# Patient Record
Sex: MALE | Race: WHITE | Employment: FULL TIME | URBAN - NONMETROPOLITAN AREA
[De-identification: names, ages, dates, MRNs, and addresses within clinical notes are randomized per-mention and may not be internally consistent; named-entity substitution may affect disease eponyms.]

---

## 2018-05-08 PROBLEM — K42.9 UMBILICAL HERNIA WITHOUT OBSTRUCTION AND WITHOUT GANGRENE: Status: ACTIVE | Noted: 2018-05-08

## 2018-05-23 ENCOUNTER — OFFICE VISIT (OUTPATIENT)
Dept: SURGERY | Age: 53
End: 2018-05-23
Payer: COMMERCIAL

## 2018-05-23 ENCOUNTER — TELEPHONE (OUTPATIENT)
Dept: SURGERY | Age: 53
End: 2018-05-23

## 2018-05-23 VITALS
SYSTOLIC BLOOD PRESSURE: 126 MMHG | HEART RATE: 80 BPM | RESPIRATION RATE: 18 BRPM | BODY MASS INDEX: 33.64 KG/M2 | TEMPERATURE: 98.6 F | WEIGHT: 235 LBS | HEIGHT: 70 IN | DIASTOLIC BLOOD PRESSURE: 80 MMHG

## 2018-05-23 DIAGNOSIS — I10 ESSENTIAL HYPERTENSION: Primary | ICD-10-CM

## 2018-05-23 DIAGNOSIS — K42.9 UMBILICAL HERNIA WITHOUT OBSTRUCTION AND WITHOUT GANGRENE: ICD-10-CM

## 2018-05-23 DIAGNOSIS — Z01.818 PRE-OP TESTING: ICD-10-CM

## 2018-05-23 PROCEDURE — 99203 OFFICE O/P NEW LOW 30 MIN: CPT | Performed by: SURGERY

## 2018-05-23 RX ORDER — QUINAPRIL HCL AND HYDROCHLOROTHIAZIDE 20; 25 MG/1; MG/1
1 TABLET ORAL DAILY
Refills: 1 | COMMUNITY
Start: 2018-04-16 | End: 2022-04-01

## 2018-05-23 ASSESSMENT — ENCOUNTER SYMPTOMS
APNEA: 0
CHEST TIGHTNESS: 0
EYE PAIN: 0
ABDOMINAL DISTENTION: 1
EYE DISCHARGE: 0
SINUS PAIN: 0
SORE THROAT: 0
CHOKING: 0
DIARRHEA: 0
ANAL BLEEDING: 0
WHEEZING: 0
TROUBLE SWALLOWING: 0
VOMITING: 0
PHOTOPHOBIA: 0
RHINORRHEA: 0
VOICE CHANGE: 0
SHORTNESS OF BREATH: 0
FACIAL SWELLING: 0
EYE ITCHING: 0
COUGH: 0
SINUS PRESSURE: 0
RECTAL PAIN: 0
EYE REDNESS: 0
NAUSEA: 0
BACK PAIN: 0
ABDOMINAL PAIN: 1
CONSTIPATION: 0
STRIDOR: 0
COLOR CHANGE: 0
BLOOD IN STOOL: 0

## 2018-06-27 ENCOUNTER — TELEPHONE (OUTPATIENT)
Dept: SURGERY | Age: 53
End: 2018-06-27

## 2018-07-03 ENCOUNTER — HOSPITAL ENCOUNTER (OUTPATIENT)
Age: 53
Discharge: HOME | End: 2018-07-03
Payer: COMMERCIAL

## 2018-07-03 DIAGNOSIS — Z01.818 PRE-OP TESTING: ICD-10-CM

## 2018-07-03 DIAGNOSIS — I10 ESSENTIAL HYPERTENSION: ICD-10-CM

## 2018-07-03 DIAGNOSIS — K42.9 UMBILICAL HERNIA WITHOUT OBSTRUCTION AND WITHOUT GANGRENE: ICD-10-CM

## 2018-07-03 LAB
ANION GAP SERPL CALCULATED.3IONS-SCNC: 11 MEQ/L (ref 8–16)
BUN BLDV-MCNC: 15 MG/DL (ref 7–22)
CALCIUM SERPL-MCNC: 9.4 MG/DL (ref 8.5–10.5)
CHLORIDE BLD-SCNC: 101 MEQ/L (ref 98–111)
CO2: 29 MEQ/L (ref 23–33)
CREAT SERPL-MCNC: 1.1 MG/DL (ref 0.4–1.2)
EKG ATRIAL RATE: 74 BPM
EKG P AXIS: 52 DEGREES
EKG P-R INTERVAL: 188 MS
EKG Q-T INTERVAL: 398 MS
EKG QRS DURATION: 82 MS
EKG QTC CALCULATION (BAZETT): 441 MS
EKG R AXIS: 96 DEGREES
EKG T AXIS: 7 DEGREES
EKG VENTRICULAR RATE: 74 BPM
GFR SERPL CREATININE-BSD FRML MDRD: 70 ML/MIN/1.73M2
GLUCOSE BLD-MCNC: 109 MG/DL (ref 70–108)
POTASSIUM SERPL-SCNC: 4.5 MEQ/L (ref 3.5–5.2)
SODIUM BLD-SCNC: 141 MEQ/L (ref 135–145)

## 2018-07-03 PROCEDURE — 80048 BASIC METABOLIC PNL TOTAL CA: CPT

## 2018-07-03 PROCEDURE — 93005 ELECTROCARDIOGRAM TRACING: CPT

## 2018-07-03 PROCEDURE — 36415 COLL VENOUS BLD VENIPUNCTURE: CPT

## 2018-07-04 PROCEDURE — 93010 ELECTROCARDIOGRAM REPORT: CPT | Performed by: INTERNAL MEDICINE

## 2018-07-09 ENCOUNTER — OFFICE VISIT (OUTPATIENT)
Dept: CARDIOLOGY CLINIC | Age: 53
End: 2018-07-09
Payer: COMMERCIAL

## 2018-07-09 VITALS
BODY MASS INDEX: 33.64 KG/M2 | DIASTOLIC BLOOD PRESSURE: 89 MMHG | HEART RATE: 82 BPM | WEIGHT: 235 LBS | HEIGHT: 70 IN | SYSTOLIC BLOOD PRESSURE: 135 MMHG

## 2018-07-09 DIAGNOSIS — Z01.810 PREOP CARDIOVASCULAR EXAM: Primary | ICD-10-CM

## 2018-07-09 PROCEDURE — 99204 OFFICE O/P NEW MOD 45 MIN: CPT | Performed by: INTERNAL MEDICINE

## 2018-07-09 ASSESSMENT — ENCOUNTER SYMPTOMS
EYE PAIN: 0
BOWEL INCONTINENCE: 0
ORTHOPNEA: 0
ABDOMINAL PAIN: 0
EYE DISCHARGE: 0
HEMOPTYSIS: 0
HOARSE VOICE: 0
DOUBLE VISION: 0
CHANGE IN BOWEL HABIT: 0
CONSTIPATION: 0
BACK PAIN: 0
COUGH: 0
BLURRED VISION: 0
SPUTUM PRODUCTION: 0
BLOATING: 0
DIARRHEA: 0
SHORTNESS OF BREATH: 1

## 2018-07-09 NOTE — PROGRESS NOTES
Patient here as new patient - Needing Cardiac Clearance     Pt complains of dizziness,palpitations,peripheral edema,shortness of breath on occasion    Pt denies:chest pain    EKG done 7-3-2018
normal.   Skin: Skin is warm and dry. Psychiatric: He has a normal mood and affect. Nursing note and vitals reviewed. No results found for: CKTOTAL, CKMB, CKMBINDEX    No results found for: WBC, RBC, HGB, HCT, MCV, MCH, MCHC, RDW, PLT, MPV    Lab Results   Component Value Date     07/03/2018    K 4.5 07/03/2018     07/03/2018    CO2 29 07/03/2018    BUN 15 07/03/2018    CREATININE 1.1 07/03/2018    CALCIUM 9.4 07/03/2018    LABGLOM 70 07/03/2018    GLUCOSE 109 07/03/2018       No results found for: ALKPHOS, ALT, AST, PROT, BILITOT, BILIDIR, IBILI, LABALBU    No results found for: MG    No results found for: INR, PROTIME      No results found for: LABA1C    No results found for: TRIG, HDL, LDLCALC, LDLDIRECT, LABVLDL    No results found for: TSH      Testing Reviewed:      I have individually reviewed the below cardiac tests    EKG:SR, inferior TWI, anterolateral infarct    ECHO: No results found for this or any previous visit. STRESS:    CATH:    Assessment/Plan       Diagnosis Orders   1. Preop cardiovascular exam  Lipid Panel    Echo 2D w doppler w color complete    NM MYOCARDIAL SPECT REST EXERCISE OR RX    [facility]     Preop risk startification for umbilical hernia repair  HTN  Obesity    Reviewed EKG  Reports dyspnea on exertion  Will get Exercise stress test with cardiolite and echo  BP well controlled  Continue Theotis Priestly  Will also refer to sleep study  Will get lipid profile  The patient is asked to make an attempt to improve diet and exercise patterns to aid in medical management of this problem. Advised patient to call office or seek immediate medical attention if there is any new onset of  any chest pain, sob, palpitations, lightheadedness, dizziness, orthopnea, PND or pedal edema. Thank you for allowing me to participate in the care of this patient. Please do not hesitate to contact me for any further questions.      Return in about 4

## 2018-07-10 ENCOUNTER — HOSPITAL ENCOUNTER (OUTPATIENT)
Dept: NON INVASIVE DIAGNOSTICS | Age: 53
Discharge: HOME | End: 2018-07-10
Payer: COMMERCIAL

## 2018-07-10 DIAGNOSIS — Z01.810 PREOP CARDIOVASCULAR EXAM: ICD-10-CM

## 2018-07-10 LAB
LV EF: 59 %
LVEF MODALITY: NORMAL

## 2018-07-10 PROCEDURE — 93017 CV STRESS TEST TRACING ONLY: CPT | Performed by: NUCLEAR MEDICINE

## 2018-07-10 PROCEDURE — 3430000000 HC RX DIAGNOSTIC RADIOPHARMACEUTICAL: Performed by: INTERNAL MEDICINE

## 2018-07-10 PROCEDURE — 78452 HT MUSCLE IMAGE SPECT MULT: CPT

## 2018-07-10 PROCEDURE — A9500 TC99M SESTAMIBI: HCPCS | Performed by: INTERNAL MEDICINE

## 2018-07-10 RX ADMIN — Medication 9.4 MILLICURIE: at 07:15

## 2018-07-10 RX ADMIN — Medication 32 MILLICURIE: at 08:35

## 2018-07-11 ENCOUNTER — TELEPHONE (OUTPATIENT)
Dept: CARDIOLOGY CLINIC | Age: 53
End: 2018-07-11

## 2018-07-12 ENCOUNTER — ANESTHESIA (OUTPATIENT)
Dept: OPERATING ROOM | Age: 53
End: 2018-07-12
Payer: COMMERCIAL

## 2018-07-12 ENCOUNTER — ANESTHESIA EVENT (OUTPATIENT)
Dept: OPERATING ROOM | Age: 53
End: 2018-07-12
Payer: COMMERCIAL

## 2018-07-12 ENCOUNTER — HOSPITAL ENCOUNTER (OUTPATIENT)
Age: 53
Setting detail: OUTPATIENT SURGERY
Discharge: HOME | End: 2018-07-12
Attending: SURGERY | Admitting: SURGERY
Payer: COMMERCIAL

## 2018-07-12 VITALS
SYSTOLIC BLOOD PRESSURE: 108 MMHG | WEIGHT: 235 LBS | TEMPERATURE: 97 F | RESPIRATION RATE: 16 BRPM | HEART RATE: 65 BPM | BODY MASS INDEX: 33.64 KG/M2 | OXYGEN SATURATION: 99 % | HEIGHT: 70 IN | DIASTOLIC BLOOD PRESSURE: 81 MMHG

## 2018-07-12 VITALS
DIASTOLIC BLOOD PRESSURE: 65 MMHG | SYSTOLIC BLOOD PRESSURE: 98 MMHG | OXYGEN SATURATION: 81 % | RESPIRATION RATE: 5 BRPM

## 2018-07-12 DIAGNOSIS — Z09 S/P UMBILICAL HERNIA REPAIR, FOLLOW-UP EXAM: Primary | ICD-10-CM

## 2018-07-12 DIAGNOSIS — K42.9 UMBILICAL HERNIA WITHOUT OBSTRUCTION AND WITHOUT GANGRENE: ICD-10-CM

## 2018-07-12 LAB — POTASSIUM SERPL-SCNC: 3.9 MEQ/L (ref 3.5–5.2)

## 2018-07-12 PROCEDURE — 7100000010 HC PHASE II RECOVERY - FIRST 15 MIN: Performed by: SURGERY

## 2018-07-12 PROCEDURE — 6360000002 HC RX W HCPCS: Performed by: SURGERY

## 2018-07-12 PROCEDURE — 7100000001 HC PACU RECOVERY - ADDTL 15 MIN: Performed by: SURGERY

## 2018-07-12 PROCEDURE — 84132 ASSAY OF SERUM POTASSIUM: CPT

## 2018-07-12 PROCEDURE — 49585 REPAIR UMBILICAL HERN,5+Y/O,REDUC: CPT | Performed by: SURGERY

## 2018-07-12 PROCEDURE — 7100000000 HC PACU RECOVERY - FIRST 15 MIN: Performed by: SURGERY

## 2018-07-12 PROCEDURE — 7100000011 HC PHASE II RECOVERY - ADDTL 15 MIN: Performed by: SURGERY

## 2018-07-12 PROCEDURE — 3700000000 HC ANESTHESIA ATTENDED CARE: Performed by: SURGERY

## 2018-07-12 PROCEDURE — 2580000003 HC RX 258: Performed by: SURGERY

## 2018-07-12 PROCEDURE — 2500000003 HC RX 250 WO HCPCS: Performed by: SURGERY

## 2018-07-12 PROCEDURE — C1781 MESH (IMPLANTABLE): HCPCS | Performed by: SURGERY

## 2018-07-12 PROCEDURE — 3600000003 HC SURGERY LEVEL 3 BASE: Performed by: SURGERY

## 2018-07-12 PROCEDURE — 36415 COLL VENOUS BLD VENIPUNCTURE: CPT

## 2018-07-12 PROCEDURE — 3600000013 HC SURGERY LEVEL 3 ADDTL 15MIN: Performed by: SURGERY

## 2018-07-12 PROCEDURE — 3700000001 HC ADD 15 MINUTES (ANESTHESIA): Performed by: SURGERY

## 2018-07-12 PROCEDURE — 6370000000 HC RX 637 (ALT 250 FOR IP): Performed by: SURGERY

## 2018-07-12 PROCEDURE — 6360000002 HC RX W HCPCS: Performed by: NURSE ANESTHETIST, CERTIFIED REGISTERED

## 2018-07-12 DEVICE — PATCH HERN M DIA2.5IN CIR W/ STRP SEPRA TECHNOLOGY ABSRB: Type: IMPLANTABLE DEVICE | Site: UMBILICAL | Status: FUNCTIONAL

## 2018-07-12 RX ORDER — MORPHINE SULFATE 2 MG/ML
2 INJECTION, SOLUTION INTRAMUSCULAR; INTRAVENOUS
Status: DISCONTINUED | OUTPATIENT
Start: 2018-07-12 | End: 2018-07-12 | Stop reason: HOSPADM

## 2018-07-12 RX ORDER — MIDAZOLAM HYDROCHLORIDE 1 MG/ML
INJECTION INTRAMUSCULAR; INTRAVENOUS PRN
Status: DISCONTINUED | OUTPATIENT
Start: 2018-07-12 | End: 2018-07-12 | Stop reason: SDUPTHER

## 2018-07-12 RX ORDER — PROPOFOL 10 MG/ML
INJECTION, EMULSION INTRAVENOUS PRN
Status: DISCONTINUED | OUTPATIENT
Start: 2018-07-12 | End: 2018-07-12 | Stop reason: SDUPTHER

## 2018-07-12 RX ORDER — SODIUM CHLORIDE 0.9 % (FLUSH) 0.9 %
10 SYRINGE (ML) INJECTION EVERY 12 HOURS SCHEDULED
Status: DISCONTINUED | OUTPATIENT
Start: 2018-07-12 | End: 2018-07-12 | Stop reason: HOSPADM

## 2018-07-12 RX ORDER — MORPHINE SULFATE 2 MG/ML
4 INJECTION, SOLUTION INTRAMUSCULAR; INTRAVENOUS
Status: DISCONTINUED | OUTPATIENT
Start: 2018-07-12 | End: 2018-07-12 | Stop reason: HOSPADM

## 2018-07-12 RX ORDER — SODIUM CHLORIDE 0.9 % (FLUSH) 0.9 %
10 SYRINGE (ML) INJECTION PRN
Status: DISCONTINUED | OUTPATIENT
Start: 2018-07-12 | End: 2018-07-12 | Stop reason: HOSPADM

## 2018-07-12 RX ORDER — HYDROCODONE BITARTRATE AND ACETAMINOPHEN 5; 325 MG/1; MG/1
1 TABLET ORAL EVERY 4 HOURS PRN
Qty: 42 TABLET | Refills: 0 | Status: SHIPPED | OUTPATIENT
Start: 2018-07-12 | End: 2018-07-19

## 2018-07-12 RX ORDER — HYDROCODONE BITARTRATE AND ACETAMINOPHEN 5; 325 MG/1; MG/1
2 TABLET ORAL EVERY 4 HOURS PRN
Status: DISCONTINUED | OUTPATIENT
Start: 2018-07-12 | End: 2018-07-12 | Stop reason: HOSPADM

## 2018-07-12 RX ORDER — ACETAMINOPHEN 325 MG/1
650 TABLET ORAL EVERY 4 HOURS PRN
Status: DISCONTINUED | OUTPATIENT
Start: 2018-07-12 | End: 2018-07-12 | Stop reason: HOSPADM

## 2018-07-12 RX ORDER — FENTANYL CITRATE 50 UG/ML
INJECTION, SOLUTION INTRAMUSCULAR; INTRAVENOUS PRN
Status: DISCONTINUED | OUTPATIENT
Start: 2018-07-12 | End: 2018-07-12 | Stop reason: SDUPTHER

## 2018-07-12 RX ORDER — ONDANSETRON 2 MG/ML
4 INJECTION INTRAMUSCULAR; INTRAVENOUS EVERY 6 HOURS PRN
Status: DISCONTINUED | OUTPATIENT
Start: 2018-07-12 | End: 2018-07-12 | Stop reason: HOSPADM

## 2018-07-12 RX ORDER — HYDROCODONE BITARTRATE AND ACETAMINOPHEN 5; 325 MG/1; MG/1
1 TABLET ORAL EVERY 4 HOURS PRN
Status: DISCONTINUED | OUTPATIENT
Start: 2018-07-12 | End: 2018-07-12 | Stop reason: HOSPADM

## 2018-07-12 RX ORDER — SODIUM CHLORIDE 9 MG/ML
INJECTION, SOLUTION INTRAVENOUS CONTINUOUS
Status: DISCONTINUED | OUTPATIENT
Start: 2018-07-12 | End: 2018-07-12 | Stop reason: HOSPADM

## 2018-07-12 RX ADMIN — MIDAZOLAM HYDROCHLORIDE 2 MG: 1 INJECTION, SOLUTION INTRAMUSCULAR; INTRAVENOUS at 08:37

## 2018-07-12 RX ADMIN — SODIUM CHLORIDE: 9 INJECTION, SOLUTION INTRAVENOUS at 08:33

## 2018-07-12 RX ADMIN — SODIUM CHLORIDE: 9 INJECTION, SOLUTION INTRAVENOUS at 07:37

## 2018-07-12 RX ADMIN — HYDROCODONE BITARTRATE AND ACETAMINOPHEN 1 TABLET: 5; 325 TABLET ORAL at 10:21

## 2018-07-12 RX ADMIN — FENTANYL CITRATE 50 MCG: 50 INJECTION INTRAMUSCULAR; INTRAVENOUS at 09:00

## 2018-07-12 RX ADMIN — PROPOFOL 50 MG: 10 INJECTION, EMULSION INTRAVENOUS at 08:45

## 2018-07-12 RX ADMIN — FENTANYL CITRATE 100 MCG: 50 INJECTION INTRAMUSCULAR; INTRAVENOUS at 08:45

## 2018-07-12 RX ADMIN — FENTANYL CITRATE 100 MCG: 50 INJECTION INTRAMUSCULAR; INTRAVENOUS at 08:37

## 2018-07-12 RX ADMIN — Medication 2 G: at 08:33

## 2018-07-12 RX ADMIN — PROPOFOL 50 MG: 10 INJECTION, EMULSION INTRAVENOUS at 08:37

## 2018-07-12 ASSESSMENT — PULMONARY FUNCTION TESTS
PIF_VALUE: 0
PIF_VALUE: 23
PIF_VALUE: 0
PIF_VALUE: 13
PIF_VALUE: 0
PIF_VALUE: 0

## 2018-07-12 ASSESSMENT — PAIN SCALES - GENERAL
PAINLEVEL_OUTOF10: 2
PAINLEVEL_OUTOF10: 3
PAINLEVEL_OUTOF10: 2
PAINLEVEL_OUTOF10: 3

## 2018-07-12 NOTE — H&P
for pulse less than 50 or greater than 120, respiratory rate less than 12 or greater than 25, temperature greater than 38.5, urinary output less than 240 mL in 8 hours, systolic BP less than 90 or greater than 717, diastolic BP less than 50 or greater than 100. Standing Status:   Standing     Number of Occurrences:   1    Up as tolerated     Standing Status:   Standing     Number of Occurrences:   40112    Weigh patient     Standing Status:   Standing     Number of Occurrences:   1    Verify surgical site confirmation documentation completed     Standing Status:   Standing     Number of Occurrences:   1    Verify discontinuation of anticoagulants/antiplatelets unless otherwise specified by physician     Standing Status:   Standing     Number of Occurrences:   1    Nursing communication- beta-blocker     If the patient is on beta-blocker medication document date and time of last dose. If the patient has not taken the beta-blocker medication within 24 hours and there is no order to give the patient a beta-blocker, notify the physician.      Standing Status:   Standing     Number of Occurrences:   1    Void on call to OR     Standing Status:   Standing     Number of Occurrences:   1    Place intermittent pneumatic compression device     When appropriate prophylactic therapy cannot be provided due to patient limitations, notify physician     Standing Status:   Standing     Number of Occurrences:   1    Full Code     Standing Status:   Standing     Number of Occurrences:   1    Initiate Oxygen Therapy Protocol     - If patient has any of the following conditions, initiate oxygen therapy: SpO2 less than 92%, Cyanosis, Chest Pain, Dyspnea, Home oxygen, or Altered level of consciousness    - If oxygen therapy initiated, enter the RT51 Nasal cannula oxygen order using Per Protocol order mode using the defaulted order parameters and titrate as specified in that order    - If oxygen therapy initiated, notify

## 2018-07-12 NOTE — ANESTHESIA PRE PROCEDURE
intended and Prophylactic antiemetics administered. Anesthetic plan and risks discussed with patient and spouse. Plan discussed with CRNA. Aria Cowan.  DO Mervat   7/12/2018

## 2018-07-13 ENCOUNTER — TELEPHONE (OUTPATIENT)
Dept: SURGERY | Age: 53
End: 2018-07-13

## 2018-07-17 ENCOUNTER — HOSPITAL ENCOUNTER (OUTPATIENT)
Dept: NON INVASIVE DIAGNOSTICS | Age: 53
Discharge: HOME | End: 2018-07-17
Payer: COMMERCIAL

## 2018-07-17 DIAGNOSIS — Z01.810 PREOP CARDIOVASCULAR EXAM: ICD-10-CM

## 2018-07-17 LAB
LV EF: 58 %
LVEF MODALITY: NORMAL

## 2018-07-17 PROCEDURE — 93306 TTE W/DOPPLER COMPLETE: CPT

## 2018-07-24 PROBLEM — Z09 S/P UMBILICAL HERNIA REPAIR, FOLLOW-UP EXAM: Status: ACTIVE | Noted: 2018-07-24

## 2018-07-24 NOTE — PROGRESS NOTES
to examination. SKIN: Skin color, texture, turgor normal. No rashes or lesions. INCISION: wound margins Still covered with glue . The glue was removed in the office there is a little bit of skin edge necrosis in the center on the bottom edge but no drainage no visible suture the incisions intact we'll continue to observe this for another 2 weeks. Anabolic ointment dressing was replaced  ABDOMEN: soft, nontender, nondistended, no masses or organomegaly. Bowel sounds normal. umbilical scar present with prominent healing ridge. No sign of recurrence, hematoma or seroma. Tenderness to palpation incisional only.                     Electronically signed by Garret Jimenez MD, FACS on 7/25/2018 at 9:11 AM

## 2018-07-25 ENCOUNTER — OFFICE VISIT (OUTPATIENT)
Dept: SURGERY | Age: 53
End: 2018-07-25

## 2018-07-25 VITALS
OXYGEN SATURATION: 98 % | SYSTOLIC BLOOD PRESSURE: 128 MMHG | DIASTOLIC BLOOD PRESSURE: 78 MMHG | TEMPERATURE: 98.7 F | HEART RATE: 89 BPM | RESPIRATION RATE: 18 BRPM

## 2018-07-25 DIAGNOSIS — Z09 S/P UMBILICAL HERNIA REPAIR, FOLLOW-UP EXAM: ICD-10-CM

## 2018-07-25 PROCEDURE — 99024 POSTOP FOLLOW-UP VISIT: CPT | Performed by: SURGERY

## 2018-08-07 PROBLEM — K42.9 UMBILICAL HERNIA WITHOUT OBSTRUCTION AND WITHOUT GANGRENE: Status: RESOLVED | Noted: 2018-05-08 | Resolved: 2018-08-07

## 2018-08-07 NOTE — PROGRESS NOTES
Outpatient Prescriptions on File Prior to Visit   Medication Sig Dispense Refill    quinapril-hydrochlorothiazide (ACCURETIC) 20-25 MG per tablet Take 1 tablet by mouth daily  1     No current facility-administered medications on file prior to visit. Allergies  Allergies   Allergen Reactions    Penicillins Hives     Social History  Social History     Social History    Marital status:      Spouse name: N/A    Number of children: N/A    Years of education: N/A     Occupational History    Not on file. Social History Main Topics    Smoking status: Never Smoker    Smokeless tobacco: Never Used    Alcohol use Yes      Comment: occasional    Drug use: No    Sexual activity: Not on file     Other Topics Concern    Not on file     Social History Narrative    No narrative on file     [address] Maintenance   Topic Date Due    HIV screen  01/01/1982    DTaP/Tdap/Td vaccine (1 - Tdap) 01/01/1986    Lipid screen  01/01/2007    Diabetes screen  01/01/2007    Shingles Vaccine (1 of 2 - 2 Dose Series) 01/01/2017    Colon cancer screen colonoscopy  01/01/2017    Flu vaccine (1) 09/01/2018    Creatinine monitoring  07/03/2019    Potassium monitoring  07/12/2019     Review of Systems  History obtained from the patient. Constitutional: Denies any fever, chills, fatigue. Wound: Denies any rash, skin color changes or wound problems. Resp: Denies any cough, shortness of breath. CV: Denies any chest pain, orthopnea or syncope. GI: Positive for incisional discomfort only. Denies any nausea, vomiting, blood in the stool, constipation or diarrhea. : Denies any hematuria, hesitancy or dysuria. OBJECTIVE    VITALS:  tympanic temperature is 98 °F (36.7 °C). His blood pressure is 118/78 and his pulse is 84. His respiration is 18 and oxygen saturation is 99%. CONSTITUTIONAL: Alert and oriented times 3, no acute distress and cooperative to examination.   SKIN: Skin color, texture, turgor normal. No rashes or lesions. INCISION: wound margins Still covered with glue . The glue was removed in the office there is a little bit of skin edge necrosis in the center on the bottom edge but no drainage no visible suture the incisions intact we'll continue to observe this for another 2 weeks. Anabolic ointment dressing was replaced  ABDOMEN: soft, nontender, nondistended, no masses or organomegaly. Bowel sounds normal. umbilical scar present with prominent healing ridge. Some scabbing present, but no signs of infection. No sign of recurrence, hematoma or seroma. Tenderness to palpation incisional only.                     Electronically signed by Jackie Morillo MD FACS on 8/8/2018 at 9:56 AM

## 2018-08-08 ENCOUNTER — OFFICE VISIT (OUTPATIENT)
Dept: SURGERY | Age: 53
End: 2018-08-08

## 2018-08-08 VITALS
TEMPERATURE: 98 F | HEART RATE: 84 BPM | RESPIRATION RATE: 18 BRPM | DIASTOLIC BLOOD PRESSURE: 78 MMHG | SYSTOLIC BLOOD PRESSURE: 118 MMHG | OXYGEN SATURATION: 99 %

## 2018-08-08 DIAGNOSIS — Z09 S/P UMBILICAL HERNIA REPAIR, FOLLOW-UP EXAM: Primary | ICD-10-CM

## 2018-08-08 PROCEDURE — 99024 POSTOP FOLLOW-UP VISIT: CPT | Performed by: SURGERY

## 2018-08-20 NOTE — PROGRESS NOTES
Aidan Mccrary MD at [address] [facility],[address]   Allergen Reactions    Penicillins Hives       Current Outpatient Prescriptions   Medication Sig Dispense Refill    quinapril-hydrochlorothiazide (ACCURETIC) 20-25 MG per tablet Take 1 tablet by mouth daily  1     No current facility-administered medications for this visit. Family History   Problem Relation Age of Onset    Kidney Disease Father         Review of Systems   General/Constitutional: He gained 10lbs of weight in the last 1month with good appetite. No fever or chills. HENT: Negative. Eyes: Negative. Upper respiratory tract: No nasal stuffiness or post nasal drip. Lower respiratory tract/ lungs: No cough or sputum production. No hemoptysis. Cardiovascular: No palpitations or chest pain. Gastrointestinal: No nausea or vomiting. Neurological: No focal neurologiacal weakness. Extremities: No edema. Musculoskeletal: No complaints. Genitourinary: No complaints. Hematological: Negative. Psychiatric/Behavioral: Negative. Skin: No itching. /78   Pulse 87   Ht 5' 10\" (1.778 m)   Wt 249 lb (112.9 kg)   SpO2 96% Comment: room air  BMI 35.73 kg/m²   Mallampati airway Class:  2  Neck Circumference:  21.5 Inches  [place] sleepiness score 8/22/18:  12       Physical Exam   Nursing note and vitals reviewed. Constitutional: Patient appears moderately built and moderately nourished. No distress. Patient is oriented to person, place, and time. HENT:   Head: Normocephalic and atraumatic. Right Ear: External ear normal.   Left Ear: External ear normal.   Mouth/Throat: Oropharynx is clear and moist.  No oral thrush. Eyes: Conjunctivae are normal. Pupils are equal, round, and reactive to light. No scleral icterus. Neck: Neck supple. No JVD present. No tracheal deviation present. Cardiovascular: Normal rate, regular rhythm, normal heart sounds. No murmur heard. Pulmonary/Chest: Effort normal and breath sounds normal. No stridor.  No respiratory distress. No wheezes. No rales. Patient exhibits no tenderness. Abdominal: Soft. Patient exhibits no distension. No tenderness. Musculoskeletal: Normal range of motion. Extremities: Patient exhibits no edema and no tenderness. Lymphadenopathy:  No cervical adenopathy. Neurological: Patient is alert and oriented to person, place, and time. Skin: Skin is warm and dry. Patient is not diaphoretic. Psychiatric: Patient  has a normal mood and affect. Patient behavior is normal.     Diagnostic Data:  None related sleep. Echocardiogram:  Transthoracic Echocardiography Report (TTE)     Demographics  Right Ventricle   Normal right ventricular size and function. Left Ventricle   Left ventricle size and systolic function is normal.   Normal left ventricular wall thickness. Ejection fraction is visually estimated at 55-60%. Conclusions      Summary   Left ventricle size and systolic function is normal.   Normal left ventricular wall thickness. Ejection fraction is visually estimated at 55-60%. Normal right ventricular size and function. Mild tricuspid regurgitation. Signature      ----------------------------------------------------------------   Electronically signed by Kathleen Mckeon MD (Interpreting   physician) on 07/17/2018 at         Assessment:  -Snoring with witnessed apneas,frequent nocturnal awakenings and excessive daytime sleepiness to evaluate for obstructive sleep apnea. -Inadequate sleep hygiene.  -Hypersomnia ( Excessive daytime sleepiness)may be due to obstructive sleep apnea Vs Inadequate sleep hygiene. -S/p umblical hernia repair with mesh placement on 7/12/18  -Hypertension on meds- under control      Recommendations/Plan:  -Will schedule patient for polysomnogram in the sleep lab.    -I had a discussion with patient regarding avialable treatment options for his sleep disorder breathing including but not limited to CPAP titration in the sleep lab Vs.Dental

## 2018-08-22 ENCOUNTER — INITIAL CONSULT (OUTPATIENT)
Dept: PULMONOLOGY | Age: 53
End: 2018-08-22
Payer: COMMERCIAL

## 2018-08-22 VITALS
SYSTOLIC BLOOD PRESSURE: 123 MMHG | HEART RATE: 87 BPM | OXYGEN SATURATION: 96 % | WEIGHT: 249 LBS | BODY MASS INDEX: 35.65 KG/M2 | DIASTOLIC BLOOD PRESSURE: 78 MMHG | HEIGHT: 70 IN

## 2018-08-22 DIAGNOSIS — R06.83 SNORING: ICD-10-CM

## 2018-08-22 DIAGNOSIS — G47.10 HYPERSOMNIA: Primary | ICD-10-CM

## 2018-08-22 DIAGNOSIS — I10 ESSENTIAL HYPERTENSION: ICD-10-CM

## 2018-08-22 PROCEDURE — 99203 OFFICE O/P NEW LOW 30 MIN: CPT | Performed by: INTERNAL MEDICINE

## 2018-08-22 NOTE — PATIENT INSTRUCTIONS
Recommendations/Plan:  -Will schedule patient for polysomnogram in the sleep lab. -I had a discussion with patient regarding avialable treatment options for his sleep disorder breathing including but not limited to CPAP titration in the sleep lab Vs.Dental appliance placement with referral to a local dentist Vs other available surgical options including Uvulopalatopharyngoplasty, maxillomandibular ostomy and tracheostomy as last option. At the end of discussion, he is not decided on his treatment if he found to have obstructive sleep apnea at this time.  -We will see Billie Hannon back in 1week after the sleep study to go over the sleep study results and further management options.  -He was educated to practice good sleep hygiene practices. He  was provided with a good sleep hygiene hand out.  -Shahzad Gomez was advised to make earlier appointment with my clinic if he develops any worsening of sleep symptoms. He verbalizes understanding.  -Shahzad Gomez was advised to not to drive any motor vehicles or operate heavy equipment until his sleep symptoms are under good control. Billie Hannon verbalizes understanding.  -He was advised to loose weight by controlling diet and doing exercise once cleared by his family physician. - Billie Hannon was educated about my impression and plan. He verbalizes understanding.

## 2018-08-23 PROBLEM — Z09 S/P UMBILICAL HERNIA REPAIR, FOLLOW-UP EXAM: Status: RESOLVED | Noted: 2018-07-24 | Resolved: 2018-08-23

## 2018-11-13 ENCOUNTER — HOSPITAL ENCOUNTER (OUTPATIENT)
Dept: AUDIOLOGY | Age: 53
Discharge: HOME | End: 2018-11-13
Payer: COMMERCIAL

## 2018-11-13 PROCEDURE — 92567 TYMPANOMETRY: CPT | Performed by: AUDIOLOGIST

## 2019-01-07 ENCOUNTER — HOSPITAL ENCOUNTER (OUTPATIENT)
Dept: AUDIOLOGY | Age: 54
Discharge: HOME | End: 2019-01-07
Payer: COMMERCIAL

## 2019-01-07 PROCEDURE — 92567 TYMPANOMETRY: CPT | Performed by: AUDIOLOGIST

## 2019-01-07 PROCEDURE — 92557 COMPREHENSIVE HEARING TEST: CPT | Performed by: AUDIOLOGIST

## 2022-03-09 ENCOUNTER — HOSPITAL ENCOUNTER (OUTPATIENT)
Dept: NON INVASIVE DIAGNOSTICS | Age: 57
Discharge: HOME | End: 2022-03-09
Payer: COMMERCIAL

## 2022-03-09 VITALS — HEIGHT: 70 IN | BODY MASS INDEX: 31.5 KG/M2 | WEIGHT: 220 LBS

## 2022-03-09 DIAGNOSIS — R07.89 OTHER CHEST PAIN: ICD-10-CM

## 2022-03-09 PROCEDURE — 93017 CV STRESS TEST TRACING ONLY: CPT

## 2022-03-09 RX ORDER — ASPIRIN 81 MG/1
TABLET, CHEWABLE ORAL
Status: DISPENSED
Start: 2022-03-09 | End: 2022-03-09

## 2022-03-10 ENCOUNTER — TELEPHONE (OUTPATIENT)
Dept: CARDIOLOGY CLINIC | Age: 57
End: 2022-03-10

## 2022-03-24 ENCOUNTER — HOSPITAL ENCOUNTER (OUTPATIENT)
Dept: NON INVASIVE DIAGNOSTICS | Age: 57
Discharge: HOME | End: 2022-03-24
Payer: COMMERCIAL

## 2022-03-24 VITALS — HEIGHT: 70 IN | WEIGHT: 220 LBS | BODY MASS INDEX: 31.5 KG/M2

## 2022-03-24 DIAGNOSIS — R07.89 OTHER CHEST PAIN: ICD-10-CM

## 2022-03-24 DIAGNOSIS — R93.89 ABNORMAL FINDINGS ON IMAGING TEST: ICD-10-CM

## 2022-03-24 LAB
LV EF: 58 %
LVEF MODALITY: NORMAL

## 2022-03-24 PROCEDURE — A9500 TC99M SESTAMIBI: HCPCS | Performed by: FAMILY MEDICINE

## 2022-03-24 PROCEDURE — 3430000000 HC RX DIAGNOSTIC RADIOPHARMACEUTICAL: Performed by: FAMILY MEDICINE

## 2022-03-24 PROCEDURE — 93017 CV STRESS TEST TRACING ONLY: CPT | Performed by: INTERNAL MEDICINE

## 2022-03-24 PROCEDURE — 78452 HT MUSCLE IMAGE SPECT MULT: CPT

## 2022-03-24 RX ADMIN — Medication 9.4 MILLICURIE: at 07:12

## 2022-03-24 RX ADMIN — Medication 32.1 MILLICURIE: at 08:18

## 2022-03-25 ENCOUNTER — TELEPHONE (OUTPATIENT)
Dept: CARDIOLOGY CLINIC | Age: 57
End: 2022-03-25

## 2022-03-25 NOTE — TELEPHONE ENCOUNTER
LM for pt to return call.     Pt had a stress test done 3-24-22 and had chest pain during test.  Dr. Lili Herbert would like pt to follow up with Dr. Chance Armendariz to discuss stress test.

## 2022-04-01 ENCOUNTER — TELEPHONE (OUTPATIENT)
Dept: CARDIOLOGY CLINIC | Age: 57
End: 2022-04-01

## 2022-04-01 ENCOUNTER — OFFICE VISIT (OUTPATIENT)
Dept: CARDIOLOGY CLINIC | Age: 57
End: 2022-04-01
Payer: COMMERCIAL

## 2022-04-01 VITALS
HEIGHT: 70 IN | WEIGHT: 232.2 LBS | HEART RATE: 71 BPM | DIASTOLIC BLOOD PRESSURE: 86 MMHG | BODY MASS INDEX: 33.24 KG/M2 | SYSTOLIC BLOOD PRESSURE: 138 MMHG

## 2022-04-01 DIAGNOSIS — R06.02 SOB (SHORTNESS OF BREATH) ON EXERTION: ICD-10-CM

## 2022-04-01 DIAGNOSIS — R94.31 ABNORMAL EKG: ICD-10-CM

## 2022-04-01 DIAGNOSIS — Z82.49 FAMILY HISTORY OF PREMATURE CAD: ICD-10-CM

## 2022-04-01 DIAGNOSIS — R07.9 CHEST PAIN ON EXERTION: Primary | ICD-10-CM

## 2022-04-01 DIAGNOSIS — I10 PRIMARY HYPERTENSION: ICD-10-CM

## 2022-04-01 DIAGNOSIS — R53.82 CHRONIC FATIGUE: ICD-10-CM

## 2022-04-01 PROCEDURE — 99204 OFFICE O/P NEW MOD 45 MIN: CPT | Performed by: INTERNAL MEDICINE

## 2022-04-01 PROCEDURE — 93000 ELECTROCARDIOGRAM COMPLETE: CPT | Performed by: INTERNAL MEDICINE

## 2022-04-01 RX ORDER — METOPROLOL TARTRATE 50 MG/1
50 TABLET, FILM COATED ORAL 2 TIMES DAILY
Qty: 60 TABLET | Refills: 3 | Status: SHIPPED | OUTPATIENT
Start: 2022-04-01 | End: 2022-04-27

## 2022-04-01 RX ORDER — QUINAPRIL HCL AND HYDROCHLOROTHIAZIDE 20; 25 MG/1; MG/1
0.5 TABLET ORAL DAILY
Qty: 30 TABLET | Refills: 3
Start: 2022-04-01

## 2022-04-01 RX ORDER — ASPIRIN 81 MG/1
81 TABLET ORAL DAILY
Qty: 90 TABLET | Refills: 1 | COMMUNITY
Start: 2022-04-01

## 2022-04-01 NOTE — LETTER
[address] [facility]  [address] [address]  Phone: [phone]  Fax: [phone]    hZeng Gallegos MD        April 1, 2022     Patient: Ysabel Jurado   YOB: 1965   Date of Visit: 4/1/2022       To Whom It May Concern: It is my medical opinion that Concetta Casas needs to be on light duty work/nonphysical work until patient gets a cardiac catherization to address the underlying medical problem. If you have any questions or concerns, please don't hesitate to call.     Sincerely,        Zheng Gallegos MD

## 2022-04-01 NOTE — PROGRESS NOTES
Chief Complaint   Patient presents with    Follow-up     to discuss stress test     Referred for  stress test abn and chest pain    Chest Pain     Patient complains of chest pain. For the last 2 months . Retrosternal, sudden in onset, Symptoms have been unchanged since that time. The patient's pain is intermittent. The patient describes the pain as heaviness, pressure and radiates to the left arm. Patient rates pain as a 4/10 in intensity. Associated symptoms are: dyspnea. Aggravating factors are: exercise, walking and physical exertion, work in assembly line. Alleviating factors are: rest.   CP last 10 to 15 min  freq - daily at work    [employer] on exertion    Rare episode of dizziness on standing and palpitation    Denied  edema    EKG done today.     nevere  Smoked    FHX  borther1 had stent in mid 52's  Brother 2 had mi in his 52's  Parent no cad  Father and brothers had atr fib            Past Surgical History:   Procedure Laterality Date    INGUINAL HERNIA REPAIR Right     age 4    KNEE SURGERY Right 1987    LUNG SURGERY  2000    [facility],5+C/T,XZFNI N/A 6/85/8410    UMBILICAL HERNIA REPAIR, POSSIBLE MESH performed by Pamela De León MD at [address] [facility] [address]   Allergen Reactions    Penicillins Hives        Family History   Problem Relation Age of Onset    Kidney Disease Father         Social History     Socioeconomic History    Marital status:      Spouse name: Not on file    Number of children: Not on file    Years of education: Not on file    Highest education level: Not on file   Occupational History    Not on file   Tobacco Use    Smoking status: Never Smoker    Smokeless tobacco: Never Used   Substance and Sexual Activity    Alcohol use: Yes     Comment: occasional    Drug use: No    Sexual activity: Not on file   Other Topics Concern    Not on file   Social History Narrative    Not on file     Social Determinants of Health     Financial Resource Strain:    [facility] Difficulty of Paying Living Expenses: Not on file   Food Insecurity:     Worried About Running Out of Food in the Last Year: Not on file    Ran Out of Food in the Last Year: Not on file   Transportation Needs:     Lack of Transportation (Medical): Not on file    Lack of Transportation (Non-Medical): Not on file   Physical Activity:     Days of Exercise per Week: Not on file    Minutes of Exercise per Session: Not on file   Stress:     Feeling of Stress : Not on file   Social Connections:     Frequency of Communication with Friends and Family: Not on file    Frequency of Social Gatherings with Friends and Family: Not on file    Attends Jain Services: Not on file    Active Member of [address] [place] [address] [employer] or Organizations: Not on file    Attends Club or Organization Meetings: Not on file    Marital Status: Not on file   Intimate Partner Violence:     Fear of Current or Ex-Partner: Not on file    Emotionally Abused: Not on file    Physically Abused: Not on file    Sexually Abused: Not on file   Housing Stability:     Unable to Pay for Housing in the Last Year: Not on file    Number of [place] in the Last Year: Not on file    Unstable Housing in the Last Year: Not on file       Current Outpatient Medications   Medication Sig Dispense Refill    isosorbide mononitrate (IMDUR) 30 MG extended release tablet Take 1 tablet by mouth daily 30 tablet 1    metoprolol tartrate (LOPRESSOR) 50 MG tablet Take 1 tablet by mouth 2 times daily 60 tablet 3    quinapril-hydroCHLOROthiazide (ACCURETIC) 20-25 MG per tablet Take 0.5 tablets by mouth daily 30 tablet 3    aspirin EC 81 MG EC tablet Take 1 tablet by mouth daily 90 tablet 1     No current facility-administered medications for this visit. Review of Systems -     General ROS: negative  Psychological ROS: negative  Hematological and Lymphatic ROS: No history of blood clots or bleeding disorder.    Respiratory ROS: no cough,  or wheezing, the rest see HPI  Cardiovascular ROS: See HPI  Gastrointestinal ROS: negative  Genito-Urinary ROS: no dysuria, trouble voiding, or hematuria  Musculoskeletal ROS: negative  Neurological ROS: no TIA or stroke symptoms  Dermatological ROS: negative      Blood pressure 138/86, pulse 71, height 5' 10\" (1.778 m), weight 232 lb 3.2 oz (105.3 kg). Physical Examination:    General appearance - alert, well appearing, and in no distress  HEENT- Pink conjunctiva  , Non-icteri sclera,PERRLA  Mental status - alert, oriented to person, place, and time  Neck - supple, no significant adenopathy, no JVD, or carotid bruits  Chest - clear to auscultation, no wheezes, rales or rhonchi, symmetric air entry  Heart - normal rate, regular rhythm, normal S1, S2, no murmurs, rubs, clicks or gallops  Abdomen - soft, nontender, nondistended, no masses or organomegaly  IZABELLA- no CVA or flank tenderness, no suprapubic tenderness  Neurological - alert, oriented, normal speech, no focal findings or movement disorder noted  Musculoskeletal/limbs - no joint tenderness, deformity or swelling   - peripheral pulses normal, no pedal edema, no clubbing or cyanosis  Skin - normal coloration and turgor, no rashes, no suspicious skin lesions noted  Psych- appropriate mood and affect    Lab  No results for input(s): CKTOTAL, CKMB, CKMBINDEX, TROPONINI in the last 72 hours.   CBC: No results found for: WBC, RBC, HGB, HCT, MCV, MCH, MCHC, RDW, PLT, MPV  BMP:    Lab Results   Component Value Date     07/03/2018    K 3.9 07/12/2018     07/03/2018    CO2 29 07/03/2018    BUN 15 07/03/2018    CREATININE 1.1 07/03/2018    CALCIUM 9.4 07/03/2018    LABGLOM 70 07/03/2018    GLUCOSE 109 07/03/2018     Hepatic Function Panel:  No results found for: ALKPHOS, ALT, AST, PROT, BILITOT, BILIDIR, IBILI, LABALBU  Magnesium:  No results found for: MG  Warfarin PT/INR:  No components found for: PTPATWAR, PTINRWAR  HgBA1c:  No results found for: LABA1C  FLP:  No results found for: TRIG, HDL, LDLCALC, LDLDIRECT, LABVLDL  TSH:  No results found for: TSH    Conclusions      Summary   Left ventricle size and systolic function is normal.   Normal left ventricular wall thickness. Ejection fraction is visually estimated at 55-60%. Normal right ventricular size and function. Mild tricuspid regurgitation. Signature      ----------------------------------------------------------------   Electronically signed by Earnestine Buckley MD (Interpreting   physician) on 07/17/2018 at 07:21 PM      Stress Type: Exercise      Peak HR: 145 bpm              HR Response: Appropriate   Peak BP: 172/82 mmHg          BP Response: Normal Resting BP with   Predicted HR: 165 bpm         appropriate response to Stress   % of predicted HR: 88         HR BP Product: 23462   Test duration:7.17 min        Max Exercise: 10.1 METS      Reason for Termination:       Exercise Effort: Fair   Conclusions      Summary   Patient develop very mild chest pain- tightness 3 min in to recovery, like   2/10 later 1/10 and resolved 10 min in to recovery. Exercise EKG stress test is not suggestive for ischemia. advised to continue asa 81 mg po daily and see his doctor asap      PS   did not have any chest pain during exercise      Recommendation   Clinical correlation is recommended. Signatures      ----------------------------------------------------------------   Electronically signed by Julian Acharya MD (Performing   Physician) on 03/09/2022 at 19:36   ----------------------------------------------------------------  ekg  Sinus  Rhythm   Low voltage in precordial leads.    -Poor R-wave progression -may be secondary to pulmonary disease   consider old anterior infarct. ABNORMAL         Assessment   Diagnosis Orders   1. Chest pain on exertion  ECHO Complete 2D W Doppler W Color    XR Cardiac Cath Procedure    Hepatic Function Panel    Lipid Panel   2.  SOB (shortness of breath) on exertion  ECHO Complete 2D W Doppler W Color    XR Cardiac Cath Procedure    Hepatic Function Panel    Lipid Panel   3. Chronic fatigue  XR Cardiac Cath Procedure    Hepatic Function Panel    Lipid Panel   4. Primary hypertension  XR Cardiac Cath Procedure    Hepatic Function Panel    Lipid Panel   5. Abnormal EKG  XR Cardiac Cath Procedure    Hepatic Function Panel    Lipid Panel   6. Family history of premature CAD  XR Cardiac Cath Procedure    Hepatic Function Panel    Lipid Panel       Plan     Continue the current treatment and with constant vigilance to changes in symptoms and also any potential side effects. Return for care or seek medical attention immediately if symptoms got worse and/or develop new symptoms. Chest pain and sob on exertion for over 2 months  Chest pain during recovery on reg ekg stress  FHX of premature cad  Abn ekg  Increase lopressor to 50 bid  Add imdur 30 mg po qd   from 25 po qd  Decrease Accuretic 20/25 to 1/2 tab po qd from 1 qd  cont asa 81  Echo  Need light work, nonphysical till get cardiac cath and address the underlying medical problem  Advised to take it easy till get cardiac cath  Need cardiac cath    The risk and benefit of left heart cath has been explain in detail including but not limited to  Bleeding including retroperitoneal bleed 1%, infection, MI, CVA, MERCED, Limb loss, dissection, allergic reaction,death  Each of them 1 in 2000 range. The alternative managment has been explained. Patient expressed understanding of the risk and benefit and of the alternative managment well. Patient wanted and agreed to proceed with left heart cath. Hence we will schedule him for [facility]    Hypertension, on medical treatment. Seems to be under good control. Patient is compliant with medical treatment.      Lipid panel and liver function test before next appointment    D/w pat the plan of care    Pat advised to get thao ER if chest pain recurs and get worse    RTC in 3 weeks         Antonietta Fermin, [facility]

## 2022-04-01 NOTE — TELEPHONE ENCOUNTER
Received FMLA, and STD forms from patient. Patient is on light duty until cardiac cath. Cardiac cath scheduled for 04/13/2022, patient will be off work for 3 days post cardiac cath, or 7 days if a cardiac stent is placed. I will complete forms, and fax them once signed by provider.

## 2022-04-01 NOTE — TELEPHONE ENCOUNTER
PROCEDURE: C    DATE OF SERVICE: 04/13/2022    SERVICE LOCATION: [facility]    CPT CODE: 65874    PHYSICIAN: DR. Immanuel Drake    DATE PRIOR AUTH SUBMITTED: 04/01/2022    STATUS:NO AUTH NEEDED.      SUBMITTED  VIA [employer] PROVIDER PORTAL     TRANSACTION ID: [id number]

## 2022-04-02 RX ORDER — ISOSORBIDE MONONITRATE 30 MG/1
30 TABLET, EXTENDED RELEASE ORAL DAILY
Qty: 30 TABLET | Refills: 1 | Status: ON HOLD | OUTPATIENT
Start: 2022-04-02 | End: 2022-04-13 | Stop reason: HOSPADM

## 2022-04-04 ENCOUNTER — TELEPHONE (OUTPATIENT)
Dept: CARDIOLOGY CLINIC | Age: 57
End: 2022-04-04

## 2022-04-04 NOTE — TELEPHONE ENCOUNTER
LM for patient to return call. Dr Estela Cox left message on nurse line stating that he ordered Imdur 30 mg daily for patient. Patient needs notified that script has been sent in and to start taking medication. Pt is also to be advised on possible HA and to take Tylenol as needed if HA occurs.

## 2022-04-04 NOTE — LETTER
[facility]  [facility].  [address] [place] [address]  Phone: [phone]  Fax: [phone]    Tiburcio Parra MD        April 4, 2022     Patient: Jerrod Allen   YOB: 1965   Date of Visit: 4/4/2022       To Whom It May Concern: It is my medical opinion that Jarod Waldrop needs to be on light duty work/nonphysical work until patient gets a cardiac catherization to address the underlying medical problem. Nonphysical work means work that does not involve lifting, pushing, pulling and no brisk walk etc..    If you have any questions or concerns, please don't hesitate to call.     Sincerely,        Tiburcio Parra MD

## 2022-04-04 NOTE — TELEPHONE ENCOUNTER
Updated letter, with more details, printed, signed and placed with [facility] paperwork.

## 2022-04-04 NOTE — TELEPHONE ENCOUNTER
FMLA and disability  forms completed, signed, and faxed to [employer] at [phone]. Forms scanned under media tab. Patient is aware forms completed and fax. Patient states he is bringing in STD forms today to be filled out as well.

## 2022-04-07 ENCOUNTER — PRE-PROCEDURE TELEPHONE (OUTPATIENT)
Dept: INPATIENT UNIT | Age: 57
End: 2022-04-07

## 2022-04-07 ENCOUNTER — HOSPITAL ENCOUNTER (OUTPATIENT)
Dept: NON INVASIVE DIAGNOSTICS | Age: 57
Discharge: HOME | End: 2022-04-07
Payer: COMMERCIAL

## 2022-04-07 DIAGNOSIS — R06.02 SOB (SHORTNESS OF BREATH) ON EXERTION: ICD-10-CM

## 2022-04-07 DIAGNOSIS — R07.9 CHEST PAIN ON EXERTION: ICD-10-CM

## 2022-04-07 LAB
LV EF: 50 %
LVEF MODALITY: NORMAL

## 2022-04-07 PROCEDURE — 93306 TTE W/DOPPLER COMPLETE: CPT

## 2022-04-07 NOTE — PROGRESS NOTES
Message left.   NPO after midnight  Bring drivers license and insurance information  Wear comfortable clean clothes  Shower morning of and night before with liquid antibacterial soap  Remove jewelry   May have to stay overnight if have PTCA/stent  Bring medications in original bottles  Made aware of visitors limit to 2 at a time  Follow all instructions given by your physician  Please notify doctor office if you need to cancel or reschedule your procedure   needed at discharge

## 2022-04-12 ENCOUNTER — PREP FOR PROCEDURE (OUTPATIENT)
Dept: CARDIOLOGY | Age: 57
End: 2022-04-12

## 2022-04-12 RX ORDER — ASPIRIN 325 MG
325 TABLET ORAL ONCE
Status: CANCELLED | OUTPATIENT
Start: 2022-04-12 | End: 2022-04-12

## 2022-04-12 RX ORDER — SODIUM CHLORIDE 9 MG/ML
INJECTION, SOLUTION INTRAVENOUS CONTINUOUS
Status: CANCELLED | OUTPATIENT
Start: 2022-04-12

## 2022-04-12 RX ORDER — NITROGLYCERIN 0.4 MG/1
0.4 TABLET SUBLINGUAL EVERY 5 MIN PRN
Status: CANCELLED | OUTPATIENT
Start: 2022-04-12

## 2022-04-12 RX ORDER — SODIUM CHLORIDE 0.9 % (FLUSH) 0.9 %
5-40 SYRINGE (ML) INJECTION PRN
Status: CANCELLED | OUTPATIENT
Start: 2022-04-12

## 2022-04-12 RX ORDER — SODIUM CHLORIDE 0.9 % (FLUSH) 0.9 %
5-40 SYRINGE (ML) INJECTION EVERY 12 HOURS SCHEDULED
Status: CANCELLED | OUTPATIENT
Start: 2022-04-12

## 2022-04-12 RX ORDER — SODIUM CHLORIDE 9 MG/ML
25 INJECTION, SOLUTION INTRAVENOUS PRN
Status: CANCELLED | OUTPATIENT
Start: 2022-04-12

## 2022-04-13 ENCOUNTER — HOSPITAL ENCOUNTER (OUTPATIENT)
Dept: INPATIENT UNIT | Age: 57
Discharge: HOME | End: 2022-04-13
Attending: INTERNAL MEDICINE | Admitting: INTERNAL MEDICINE
Payer: COMMERCIAL

## 2022-04-13 VITALS
HEIGHT: 70 IN | RESPIRATION RATE: 14 BRPM | HEART RATE: 74 BPM | SYSTOLIC BLOOD PRESSURE: 128 MMHG | WEIGHT: 228 LBS | TEMPERATURE: 97.9 F | OXYGEN SATURATION: 94 % | DIASTOLIC BLOOD PRESSURE: 94 MMHG | BODY MASS INDEX: 32.64 KG/M2

## 2022-04-13 PROBLEM — Z98.890 S/P CARDIAC CATH: Status: ACTIVE | Noted: 2022-04-13

## 2022-04-13 LAB
ABO: NORMAL
ANION GAP SERPL CALCULATED.3IONS-SCNC: 12 MEQ/L (ref 8–16)
ANTIBODY SCREEN: NORMAL
APTT: 34.4 SECONDS (ref 22–38)
BUN BLDV-MCNC: 18 MG/DL (ref 7–22)
CALCIUM SERPL-MCNC: 9.3 MG/DL (ref 8.5–10.5)
CHLORIDE BLD-SCNC: 101 MEQ/L (ref 98–111)
CHOLESTEROL, TOTAL: 145 MG/DL (ref 100–199)
CO2: 25 MEQ/L (ref 23–33)
CREAT SERPL-MCNC: 1.1 MG/DL (ref 0.4–1.2)
ERYTHROCYTE [DISTWIDTH] IN BLOOD BY AUTOMATED COUNT: 14 % (ref 11.5–14.5)
ERYTHROCYTE [DISTWIDTH] IN BLOOD BY AUTOMATED COUNT: 45.9 FL (ref 35–45)
GFR SERPL CREATININE-BSD FRML MDRD: 69 ML/MIN/1.73M2
GLUCOSE BLD-MCNC: 88 MG/DL (ref 70–108)
HCT VFR BLD CALC: 47.3 % (ref 42–52)
HDLC SERPL-MCNC: 40 MG/DL
HEMOGLOBIN: 15.1 GM/DL (ref 14–18)
INR BLD: 0.98 (ref 0.85–1.13)
LDL CHOLESTEROL CALCULATED: 81 MG/DL
MCH RBC QN AUTO: 28.7 PG (ref 26–33)
MCHC RBC AUTO-ENTMCNC: 31.9 GM/DL (ref 32.2–35.5)
MCV RBC AUTO: 89.8 FL (ref 80–94)
PLATELET # BLD: 390 THOU/MM3 (ref 130–400)
PMV BLD AUTO: 9.6 FL (ref 9.4–12.4)
POTASSIUM SERPL-SCNC: 4.1 MEQ/L (ref 3.5–5.2)
RBC # BLD: 5.27 MILL/MM3 (ref 4.7–6.1)
RH FACTOR: NORMAL
SODIUM BLD-SCNC: 138 MEQ/L (ref 135–145)
TRIGL SERPL-MCNC: 122 MG/DL (ref 0–199)
WBC # BLD: 10.1 THOU/MM3 (ref 4.8–10.8)

## 2022-04-13 PROCEDURE — 80061 LIPID PANEL: CPT

## 2022-04-13 PROCEDURE — 86901 BLOOD TYPING SEROLOGIC RH(D): CPT

## 2022-04-13 PROCEDURE — C1769 GUIDE WIRE: HCPCS

## 2022-04-13 PROCEDURE — 6360000002 HC RX W HCPCS

## 2022-04-13 PROCEDURE — 86850 RBC ANTIBODY SCREEN: CPT

## 2022-04-13 PROCEDURE — 93005 ELECTROCARDIOGRAM TRACING: CPT | Performed by: PHYSICIAN ASSISTANT

## 2022-04-13 PROCEDURE — 85027 COMPLETE CBC AUTOMATED: CPT

## 2022-04-13 PROCEDURE — 85730 THROMBOPLASTIN TIME PARTIAL: CPT

## 2022-04-13 PROCEDURE — 93458 L HRT ARTERY/VENTRICLE ANGIO: CPT | Performed by: INTERNAL MEDICINE

## 2022-04-13 PROCEDURE — 2500000003 HC RX 250 WO HCPCS

## 2022-04-13 PROCEDURE — 2580000003 HC RX 258: Performed by: PHYSICIAN ASSISTANT

## 2022-04-13 PROCEDURE — 86900 BLOOD TYPING SEROLOGIC ABO: CPT

## 2022-04-13 PROCEDURE — 93458 L HRT ARTERY/VENTRICLE ANGIO: CPT

## 2022-04-13 PROCEDURE — 36415 COLL VENOUS BLD VENIPUNCTURE: CPT

## 2022-04-13 PROCEDURE — 6360000004 HC RX CONTRAST MEDICATION: Performed by: INTERNAL MEDICINE

## 2022-04-13 PROCEDURE — 85610 PROTHROMBIN TIME: CPT

## 2022-04-13 PROCEDURE — 80048 BASIC METABOLIC PNL TOTAL CA: CPT

## 2022-04-13 PROCEDURE — C1894 INTRO/SHEATH, NON-LASER: HCPCS

## 2022-04-13 RX ORDER — SODIUM CHLORIDE 9 MG/ML
25 INJECTION, SOLUTION INTRAVENOUS PRN
Status: DISCONTINUED | OUTPATIENT
Start: 2022-04-13 | End: 2022-04-13 | Stop reason: HOSPADM

## 2022-04-13 RX ORDER — ACETAMINOPHEN 325 MG/1
650 TABLET ORAL EVERY 4 HOURS PRN
Status: DISCONTINUED | OUTPATIENT
Start: 2022-04-13 | End: 2022-04-13 | Stop reason: HOSPADM

## 2022-04-13 RX ORDER — SODIUM CHLORIDE 9 MG/ML
INJECTION, SOLUTION INTRAVENOUS CONTINUOUS
Status: DISCONTINUED | OUTPATIENT
Start: 2022-04-13 | End: 2022-04-13 | Stop reason: HOSPADM

## 2022-04-13 RX ORDER — SODIUM CHLORIDE 0.9 % (FLUSH) 0.9 %
5-40 SYRINGE (ML) INJECTION EVERY 12 HOURS SCHEDULED
Status: DISCONTINUED | OUTPATIENT
Start: 2022-04-13 | End: 2022-04-13 | Stop reason: HOSPADM

## 2022-04-13 RX ORDER — ONDANSETRON 2 MG/ML
4 INJECTION INTRAMUSCULAR; INTRAVENOUS EVERY 6 HOURS PRN
Status: DISCONTINUED | OUTPATIENT
Start: 2022-04-13 | End: 2022-04-13 | Stop reason: HOSPADM

## 2022-04-13 RX ORDER — NITROGLYCERIN 0.4 MG/1
0.4 TABLET SUBLINGUAL EVERY 5 MIN PRN
Status: DISCONTINUED | OUTPATIENT
Start: 2022-04-13 | End: 2022-04-13 | Stop reason: HOSPADM

## 2022-04-13 RX ORDER — SODIUM CHLORIDE 0.9 % (FLUSH) 0.9 %
5-40 SYRINGE (ML) INJECTION PRN
Status: DISCONTINUED | OUTPATIENT
Start: 2022-04-13 | End: 2022-04-13 | Stop reason: HOSPADM

## 2022-04-13 RX ORDER — SODIUM CHLORIDE 0.9 % (FLUSH) 0.9 %
5-40 SYRINGE (ML) INJECTION EVERY 12 HOURS SCHEDULED
Status: DISCONTINUED | OUTPATIENT
Start: 2022-04-13 | End: 2022-04-13 | Stop reason: SDUPTHER

## 2022-04-13 RX ORDER — ASPIRIN 325 MG
325 TABLET ORAL ONCE
Status: DISCONTINUED | OUTPATIENT
Start: 2022-04-13 | End: 2022-04-13 | Stop reason: HOSPADM

## 2022-04-13 RX ADMIN — IOPAMIDOL 80 ML: 755 INJECTION, SOLUTION INTRAVENOUS at 13:49

## 2022-04-13 RX ADMIN — SODIUM CHLORIDE: 9 INJECTION, SOLUTION INTRAVENOUS at 11:47

## 2022-04-13 NOTE — H&P
[address] [address]  Sedation/Analgesia History & Physical    Pt Name: Elda Kearns  MRN: [id number]  YOB: 1965  Provider Performing Procedure: Skye Deleon MD  Primary Care Physician: Chioma Hernandez MD    PRE-PROCEDURE   DNR-CCA/DNR-CC []Yes [x]No  Brief History/Pre-Procedure Diagnosis:     Chest pain on exertion CCS class III and abn ekg stress with cp  Need cath  The risk and benefit of left heart cath has been explain in detail including but not limited to  Bleeding including retroperitoneal bleed 1%, infection, MI, CVA, MERCED, Limb loss, dissection, allergic reaction,death  Each of them 1 in 2000 range. The alternative managment has been explained. Patient expressed understanding of the risk and benefit and of the alternative managment well. Patient wanted and agreed to proceed with left heart cath. Hence we will schedule him for [address] [place] [address]               MEDICAL HISTORY  []CAD/Valve  []Liver Disease  []Lung Disease []Diabetes  []Hypertension []Renal Disease  []Additional information:       has a past medical history of Hypertension and Pneumonia. SURGICAL HISTORY   has a past surgical history that includes Lung surgery (2000); Inguinal hernia repair (Right); knee surgery (Right, 1987); and repair umbilical JILD,8+W/G,AEUGA (N/A, 7/12/2018).   Additional information:       ALLERGIES   Allergies as of 04/13/2022 - Fully Reviewed 04/01/2022   Allergen Reaction Noted    Penicillins Hives 05/23/2018    Imdur [isosorbide nitrate] Other (See Comments) 04/13/2022     Additional information:       MEDICATIONS   Coumadin Use Last 5 Days [x]No []Yes  Antiplatelet drug therapy use last 5 days  []No [x]Yes  Other anticoagulant use last 5 days  [x]No []Yes    Current Facility-Administered Medications:     0.9 % sodium chloride infusion, , IntraVENous, Continuous, Julianna Garvin PA-C, Last Rate: 75 mL/hr at 04/13/22 1147, New Bag at 04/13/22 1147    aspirin tablet 325 mg, 325 mg, Oral, Once, Juliano Hunt PA-C    nitroGLYCERIN (NITROSTAT) SL tablet 0.4 mg, 0.4 mg, SubLINGual, Q5 Min PRN, Juliano Hunt PA-C    sodium chloride flush 0.9 % injection 5-40 mL, 5-40 mL, IntraVENous, 2 times per day, Juliano Hunt PA-C  Prior to Admission medications    Medication Sig Start Date End Date Taking? Authorizing Provider   isosorbide mononitrate (IMDUR) 30 MG extended release tablet Take 1 tablet by mouth daily  Patient not taking: Reported on 4/13/2022 4/2/22   Zheng Gallegos MD   metoprolol tartrate (LOPRESSOR) 50 MG tablet Take 1 tablet by mouth 2 times daily 4/1/22   Zheng Gallegos MD   quinapril-hydroCHLOROthiazide (ACCURETIC) 20-25 MG per tablet Take 0.5 tablets by mouth daily 4/1/22   Zheng Gallegos MD   aspirin EC 81 MG EC tablet Take 1 tablet by mouth daily 4/1/22   Zheng Gallegos MD     Additional information:       VITAL SIGNS   Vitals:    04/13/22 1105   BP: (!) 137/90   Pulse: 66   Resp: 15   Temp: 98.1 °F (36.7 °C)   SpO2:        PHYSICAL:   Heart:  [x]Regular rate and rhythm  []Other:    Lungs:  [x]Clear    []Other:    Abdomen: [x]Soft    []Other:    Mental Status: [x]Alert & Oriented  []Other:      PLANNED PROCEDURE   [x]Cath  []PCI                []Pacemaker/AICD  []SVEN             []Cardioversion []Peripheral angiography/PTA  []Other:      SEDATION  Planned agent:[x]Midazolam []Meperidine [x]Sublimaze []Morphine  []Diazepam  []Other:     ASA Classification:  []1 []2 [x]3 []4 []5  Class 1: A normal healthy patient  Class 2: Pt with mild to moderate systemic disease  Class 3: Severe systemic disease or disturbance  Class 4: Severe systemic disorders that are already life threatening. Class 5: Moribund pt with little chances of survival, for more than 24 hours. Mallampati I Airway Classification:   []1 [x]2 []3 []4    [x]Pre-procedure diagnostic studies complete and results available. Comment:    [x]Previous sedation/anesthesia experiences assessed.    Comment:    [x]The patient is an appropriate candidate to undergo the planned procedure sedation and anesthesia. (Refer to nursing sedation/analgesia documentation record)  [x]Formulation and discussion of sedation/procedure plan, risks, and expectations with patient and/or responsible adult completed. [x]Patient examined immediately prior to the procedure.  (Refer to nursing sedation/analgesia documentation record)    Sinai Zelaya MD  Electronically signed 4/13/2022 at 12:28 PM

## 2022-04-13 NOTE — PLAN OF CARE
Problem: Discharge Planning:  Goal: Participates in care planning  Description: Participates in care planning  4/13/2022 1721 by Blaze Kang, RN  Outcome: Met This Shift  4/13/2022 1050 by Blaze Kang, RN  Outcome: Ongoing   Pt discharge home with wife

## 2022-04-13 NOTE — PROGRESS NOTES
Pt admitted to  [address] [address] ambulatory for cardiac cath. Pt NPO. Patient accompanied by wife. Vital signs obtained. Assessment and data collection initiated. Oriented to room. Policies and procedures for 2E explained   All questions answered with no further questions at this time. Fall prevention and safety precautions discussed with patient. 1230  Pt to cath alb per bed  1355  Pt ret'd from cath lab per bed. Right radial site with vasc band and armboard. Pt denies any pain. IV 0.9NS cont'd. Family @ bedside  1430  Pt taking diet/fluids - romaine well  1645  Pt amb in rick - romaine well  Ret'd to room, resting in recliner  1716  IV site/telemetry discont'd. Discharge instructions reviewed with pt and wife - they voice understanding r/t f/u appointment, how to care for procedure site and activity restrictions. 0  Pt discharged per w/c for transport home with his wife.

## 2022-04-14 LAB
EKG ATRIAL RATE: 66 BPM
EKG P AXIS: 23 DEGREES
EKG P-R INTERVAL: 210 MS
EKG Q-T INTERVAL: 412 MS
EKG QRS DURATION: 82 MS
EKG QTC CALCULATION (BAZETT): 431 MS
EKG R AXIS: -7 DEGREES
EKG T AXIS: 22 DEGREES
EKG VENTRICULAR RATE: 66 BPM

## 2022-04-14 PROCEDURE — 93010 ELECTROCARDIOGRAM REPORT: CPT | Performed by: INTERNAL MEDICINE

## 2022-04-20 ENCOUNTER — HOSPITAL ENCOUNTER (EMERGENCY)
Age: 57
Discharge: HOME | End: 2022-04-20
Attending: EMERGENCY MEDICINE
Payer: COMMERCIAL

## 2022-04-20 ENCOUNTER — APPOINTMENT (OUTPATIENT)
Dept: CT IMAGING | Age: 57
End: 2022-04-20
Payer: COMMERCIAL

## 2022-04-20 ENCOUNTER — APPOINTMENT (OUTPATIENT)
Dept: GENERAL RADIOLOGY | Age: 57
End: 2022-04-20
Payer: COMMERCIAL

## 2022-04-20 VITALS
RESPIRATION RATE: 22 BRPM | HEIGHT: 70 IN | BODY MASS INDEX: 32.64 KG/M2 | OXYGEN SATURATION: 96 % | SYSTOLIC BLOOD PRESSURE: 140 MMHG | HEART RATE: 88 BPM | WEIGHT: 228 LBS | DIASTOLIC BLOOD PRESSURE: 103 MMHG | TEMPERATURE: 97.5 F

## 2022-04-20 DIAGNOSIS — J18.9 PNEUMONIA OF BOTH LOWER LOBES DUE TO INFECTIOUS ORGANISM: Primary | ICD-10-CM

## 2022-04-20 LAB
ALBUMIN SERPL-MCNC: 4.3 G/DL (ref 3.5–5.1)
ALP BLD-CCNC: 78 U/L (ref 38–126)
ALT SERPL-CCNC: 26 U/L (ref 11–66)
ANION GAP SERPL CALCULATED.3IONS-SCNC: 12 MEQ/L (ref 8–16)
APTT: 36.2 SECONDS (ref 22–38)
AST SERPL-CCNC: 20 U/L (ref 5–40)
BASOPHILS # BLD: 1.2 %
BASOPHILS ABSOLUTE: 0.1 THOU/MM3 (ref 0–0.1)
BILIRUB SERPL-MCNC: 0.3 MG/DL (ref 0.3–1.2)
BILIRUBIN DIRECT: < 0.2 MG/DL (ref 0–0.3)
BUN BLDV-MCNC: 20 MG/DL (ref 7–22)
CALCIUM SERPL-MCNC: 9.2 MG/DL (ref 8.5–10.5)
CHLORIDE BLD-SCNC: 100 MEQ/L (ref 98–111)
CO2: 23 MEQ/L (ref 23–33)
CREAT SERPL-MCNC: 1 MG/DL (ref 0.4–1.2)
EKG ATRIAL RATE: 85 BPM
EKG P AXIS: 36 DEGREES
EKG P-R INTERVAL: 196 MS
EKG Q-T INTERVAL: 358 MS
EKG QRS DURATION: 72 MS
EKG QTC CALCULATION (BAZETT): 426 MS
EKG R AXIS: -23 DEGREES
EKG T AXIS: 45 DEGREES
EKG VENTRICULAR RATE: 85 BPM
EOSINOPHIL # BLD: 2.6 %
EOSINOPHILS ABSOLUTE: 0.3 THOU/MM3 (ref 0–0.4)
ERYTHROCYTE [DISTWIDTH] IN BLOOD BY AUTOMATED COUNT: 13.9 % (ref 11.5–14.5)
ERYTHROCYTE [DISTWIDTH] IN BLOOD BY AUTOMATED COUNT: 45 FL (ref 35–45)
FLU A ANTIGEN: NEGATIVE
FLU B ANTIGEN: NEGATIVE
GFR SERPL CREATININE-BSD FRML MDRD: 77 ML/MIN/1.73M2
GLUCOSE BLD-MCNC: 144 MG/DL (ref 70–108)
HCT VFR BLD CALC: 45.6 % (ref 42–52)
HEMOGLOBIN: 15.1 GM/DL (ref 14–18)
IMMATURE GRANS (ABS): 0.03 THOU/MM3 (ref 0–0.07)
IMMATURE GRANULOCYTES: 0.3 %
INR BLD: 1.01 (ref 0.85–1.13)
LIPASE: 38 U/L (ref 5.6–51.3)
LYMPHOCYTES # BLD: 15.5 %
LYMPHOCYTES ABSOLUTE: 1.7 THOU/MM3 (ref 1–4.8)
MAGNESIUM: 2.2 MG/DL (ref 1.6–2.4)
MCH RBC QN AUTO: 29.1 PG (ref 26–33)
MCHC RBC AUTO-ENTMCNC: 33.1 GM/DL (ref 32.2–35.5)
MCV RBC AUTO: 87.9 FL (ref 80–94)
MONOCYTES # BLD: 14 %
MONOCYTES ABSOLUTE: 1.6 THOU/MM3 (ref 0.4–1.3)
NUCLEATED RED BLOOD CELLS: 0 /100 WBC
OSMOLALITY CALCULATION: 275.2 MOSMOL/KG (ref 275–300)
PLATELET # BLD: 367 THOU/MM3 (ref 130–400)
PMV BLD AUTO: 9.4 FL (ref 9.4–12.4)
POTASSIUM SERPL-SCNC: 3.9 MEQ/L (ref 3.5–5.2)
PRO-BNP: 6 PG/ML (ref 0–900)
RBC # BLD: 5.19 MILL/MM3 (ref 4.7–6.1)
SARS-COV-2, NAAT: NOT  DETECTED
SEG NEUTROPHILS: 66.4 %
SEGMENTED NEUTROPHILS ABSOLUTE COUNT: 7.4 THOU/MM3 (ref 1.8–7.7)
SODIUM BLD-SCNC: 135 MEQ/L (ref 135–145)
TOTAL PROTEIN: 6.9 G/DL (ref 6.1–8)
TROPONIN T: < 0.01 NG/ML
TROPONIN T: < 0.01 NG/ML
WBC # BLD: 11.1 THOU/MM3 (ref 4.8–10.8)

## 2022-04-20 PROCEDURE — 6370000000 HC RX 637 (ALT 250 FOR IP): Performed by: EMERGENCY MEDICINE

## 2022-04-20 PROCEDURE — 96375 TX/PRO/DX INJ NEW DRUG ADDON: CPT

## 2022-04-20 PROCEDURE — C9113 INJ PANTOPRAZOLE SODIUM, VIA: HCPCS | Performed by: EMERGENCY MEDICINE

## 2022-04-20 PROCEDURE — 80053 COMPREHEN METABOLIC PANEL: CPT

## 2022-04-20 PROCEDURE — 85610 PROTHROMBIN TIME: CPT

## 2022-04-20 PROCEDURE — 83880 ASSAY OF NATRIURETIC PEPTIDE: CPT

## 2022-04-20 PROCEDURE — 6360000004 HC RX CONTRAST MEDICATION: Performed by: EMERGENCY MEDICINE

## 2022-04-20 PROCEDURE — 85730 THROMBOPLASTIN TIME PARTIAL: CPT

## 2022-04-20 PROCEDURE — 94640 AIRWAY INHALATION TREATMENT: CPT

## 2022-04-20 PROCEDURE — 85025 COMPLETE CBC W/AUTO DIFF WBC: CPT

## 2022-04-20 PROCEDURE — 99285 EMERGENCY DEPT VISIT HI MDM: CPT

## 2022-04-20 PROCEDURE — 6360000002 HC RX W HCPCS: Performed by: EMERGENCY MEDICINE

## 2022-04-20 PROCEDURE — 2580000003 HC RX 258: Performed by: EMERGENCY MEDICINE

## 2022-04-20 PROCEDURE — 84484 ASSAY OF TROPONIN QUANT: CPT

## 2022-04-20 PROCEDURE — 96374 THER/PROPH/DIAG INJ IV PUSH: CPT

## 2022-04-20 PROCEDURE — 87804 INFLUENZA ASSAY W/OPTIC: CPT

## 2022-04-20 PROCEDURE — A4216 STERILE WATER/SALINE, 10 ML: HCPCS | Performed by: EMERGENCY MEDICINE

## 2022-04-20 PROCEDURE — 87040 BLOOD CULTURE FOR BACTERIA: CPT

## 2022-04-20 PROCEDURE — 87635 SARS-COV-2 COVID-19 AMP PRB: CPT

## 2022-04-20 PROCEDURE — 71045 X-RAY EXAM CHEST 1 VIEW: CPT

## 2022-04-20 PROCEDURE — 71275 CT ANGIOGRAPHY CHEST: CPT

## 2022-04-20 PROCEDURE — 82248 BILIRUBIN DIRECT: CPT

## 2022-04-20 PROCEDURE — 83735 ASSAY OF MAGNESIUM: CPT

## 2022-04-20 PROCEDURE — 94760 N-INVAS EAR/PLS OXIMETRY 1: CPT

## 2022-04-20 PROCEDURE — 93005 ELECTROCARDIOGRAM TRACING: CPT | Performed by: EMERGENCY MEDICINE

## 2022-04-20 PROCEDURE — 83690 ASSAY OF LIPASE: CPT

## 2022-04-20 RX ORDER — KETOROLAC TROMETHAMINE 30 MG/ML
15 INJECTION, SOLUTION INTRAMUSCULAR; INTRAVENOUS ONCE
Status: COMPLETED | OUTPATIENT
Start: 2022-04-20 | End: 2022-04-20

## 2022-04-20 RX ORDER — ONDANSETRON 2 MG/ML
4 INJECTION INTRAMUSCULAR; INTRAVENOUS ONCE
Status: DISCONTINUED | OUTPATIENT
Start: 2022-04-20 | End: 2022-04-20 | Stop reason: HOSPADM

## 2022-04-20 RX ORDER — ALBUTEROL SULFATE 90 UG/1
2 AEROSOL, METERED RESPIRATORY (INHALATION) 4 TIMES DAILY PRN
Qty: 18 G | Refills: 0 | Status: SHIPPED | OUTPATIENT
Start: 2022-04-20 | End: 2022-10-31

## 2022-04-20 RX ORDER — IPRATROPIUM BROMIDE AND ALBUTEROL SULFATE 2.5; .5 MG/3ML; MG/3ML
1 SOLUTION RESPIRATORY (INHALATION) ONCE
Status: COMPLETED | OUTPATIENT
Start: 2022-04-20 | End: 2022-04-20

## 2022-04-20 RX ORDER — LEVOFLOXACIN 500 MG/1
500 TABLET, FILM COATED ORAL DAILY
Qty: 7 TABLET | Refills: 0 | Status: SHIPPED | OUTPATIENT
Start: 2022-04-20 | End: 2022-04-27

## 2022-04-20 RX ADMIN — KETOROLAC TROMETHAMINE 15 MG: 30 INJECTION, SOLUTION INTRAMUSCULAR; INTRAVENOUS at 03:54

## 2022-04-20 RX ADMIN — IPRATROPIUM BROMIDE AND ALBUTEROL SULFATE 1 AMPULE: .5; 3 SOLUTION RESPIRATORY (INHALATION) at 04:14

## 2022-04-20 RX ADMIN — SODIUM CHLORIDE 40 MG: 9 INJECTION, SOLUTION INTRAMUSCULAR; INTRAVENOUS; SUBCUTANEOUS at 03:54

## 2022-04-20 RX ADMIN — IPRATROPIUM BROMIDE AND ALBUTEROL SULFATE 1 AMPULE: .5; 3 SOLUTION RESPIRATORY (INHALATION) at 04:21

## 2022-04-20 RX ADMIN — IOPAMIDOL 80 ML: 755 INJECTION, SOLUTION INTRAVENOUS at 03:00

## 2022-04-20 ASSESSMENT — ENCOUNTER SYMPTOMS
TROUBLE SWALLOWING: 0
ABDOMINAL DISTENTION: 0
VOICE CHANGE: 0
NAUSEA: 0
EYE DISCHARGE: 0
ABDOMINAL PAIN: 0
CHOKING: 0
RHINORRHEA: 0
EYE ITCHING: 0
COUGH: 0
BLOOD IN STOOL: 0
SHORTNESS OF BREATH: 1
EYE PAIN: 0
CHEST TIGHTNESS: 0
WHEEZING: 0
CONSTIPATION: 0
SORE THROAT: 0
SINUS PRESSURE: 0
BACK PAIN: 0
DIARRHEA: 0
VOMITING: 0
EYE REDNESS: 0
PHOTOPHOBIA: 0

## 2022-04-20 ASSESSMENT — PAIN DESCRIPTION - FREQUENCY: FREQUENCY: CONTINUOUS

## 2022-04-20 ASSESSMENT — PAIN SCALES - GENERAL
PAINLEVEL_OUTOF10: 3
PAINLEVEL_OUTOF10: 4

## 2022-04-20 ASSESSMENT — PAIN - FUNCTIONAL ASSESSMENT
PAIN_FUNCTIONAL_ASSESSMENT: 0-10
PAIN_FUNCTIONAL_ASSESSMENT: 0-10

## 2022-04-20 ASSESSMENT — PAIN DESCRIPTION - DESCRIPTORS: DESCRIPTORS: PRESSURE

## 2022-04-20 ASSESSMENT — PAIN DESCRIPTION - PAIN TYPE: TYPE: ACUTE PAIN

## 2022-04-20 ASSESSMENT — PAIN DESCRIPTION - LOCATION: LOCATION: CHEST

## 2022-04-20 NOTE — LETTER
[facility] [address] [address] EMERGENCY DEPT  [address] [place] [address]  Phone: [phone]               April 20, 2022    Patient: Javier Dorsey   YOB: 1965   Date of Visit: 4/20/2022       To Whom It May Concern:    Yaritza Szymanski was seen and treated in our emergency department on 4/20/2022. He may return to work on 4/21/2022.       Sincerely,     Alisa Escamilla RN per  [address] [place] [address] Road         Signature:__________________________________

## 2022-04-20 NOTE — ED NOTES
Patient resting in bed. No distress noted. Updated on POC. Call light within reach.        Usman Hartman RN  04/20/22 3171

## 2022-04-20 NOTE — ED PROVIDER NOTES
[facility]  eMERGENCY dEPARTMENT eNCOUnter          CHIEF COMPLAINT       Chief Complaint   Patient presents with    Shortness of Breath       Nurses Notes reviewed and I agree except as noted in the HPI. HISTORY OF PRESENT ILLNESS    Iraida Guadalupe is a 56 y.o. male who presents patient presents complaining of chest pain shortness of breath. States he feels like heaviness. States he feels like he cannot get a complete breath. Patient states that he woke up like this at 2:00. He states its not getting any better. Patient denies any radiation or referral to pain. Patient states that he feels like he has the chills but he has not had any fevers at home. He has not been coughing. Patient has no abdominal pain or changes in bowel or bladder habits. He has had no syncopal or presyncopal episodes. Patient is otherwise resting comfortably on the cot no apparent distress hemodynamically stable oxygen saturation between 96 and 100% on room air. Patient had a recent cardiac catheterization which did not reveal any significant coronary artery disease. Cardiac catheterization  Conclusions      Procedure Summary   Angiographically Patent Coronaries. 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 BOTHER LCX AND RCA HAS SEPARATE OSTIUM FROM RIGHT SINUS OF VALSALVA BUT   SEPTATE OSTIUM      Normal LV systolic function. LVEF approximately 55% . LV size - normal.   EDP 14 mmhg   No Transaortic Gradient      Recommendations   Continue with aggressive risk factor modification and medical therapy. WORK UP FOR NONCARDIAC CAUSE OF CP   CONSIDER CORONARY CT ONCE OTHER CAUSES OF CP EXCLUDED AND IF PAT REMAIN TO   HAVE CP - TO EXCLUDE RARE EVENTS OF INTER ARTERIAL COURSE      Estimated Blood Loss:10 ml. Complications:No complications.       Signatures      ----------------------------------------------------------------   Electronically signed by Bib Nolasco MD (Performing   Physician) on 04/13/2022 at 14:13   ----------------------------------------------------------------  Echocardiogram   Conclusions      Summary   Normal left ventricle size and Low Normal LV systolic function. Ejection   fraction was estimated at 50 %. There were no regional left ventricular   wall motion abnormalities and wall thickness was within normal limits. Doppler parameters were consistent with abnormal left ventricular   relaxation (grade 1 diastolic dysfunction). When this echo is compared with the echo from -7/2018, no significant   interval changes have occurred. Signature      ----------------------------------------------------------------   Electronically signed by Bib Nolasco MD (Interpreting   physician) on 04/07/2022 at 05:17 PM   ----------------------------------------------------------------      REVIEW OF SYSTEMS     Review of Systems   Constitutional: Negative for activity change, appetite change, diaphoresis, fatigue and unexpected weight change. HENT: Negative for congestion, ear discharge, ear pain, hearing loss, rhinorrhea, sinus pressure, sore throat, trouble swallowing and voice change. Eyes: Negative for photophobia, pain, discharge, redness and itching. Respiratory: Positive for shortness of breath. Negative for cough, choking, chest tightness and wheezing. Cardiovascular: Positive for chest pain. Negative for palpitations and leg swelling. Gastrointestinal: Negative for abdominal distention, abdominal pain, blood in stool, constipation, diarrhea, nausea and vomiting. Endocrine: Negative for polydipsia, polyphagia and polyuria. Genitourinary: Negative for decreased urine volume, difficulty urinating, dysuria, enuresis, frequency, hematuria and urgency. Musculoskeletal: Negative for arthralgias, back pain, gait problem, myalgias, neck pain and neck stiffness. Skin: Negative for pallor and rash.    Allergic/Immunologic: Eyes:      General: Lids are normal. No scleral icterus. Right eye: No discharge. Left eye: No discharge. Conjunctiva/sclera: Conjunctivae normal.      Right eye: No exudate. Left eye: No exudate. Pupils: Pupils are equal, round, and reactive to light. Neck:      Thyroid: No thyromegaly. Vascular: No JVD. Trachea: No tracheal deviation. Cardiovascular:      Rate and Rhythm: Normal rate and regular rhythm. Pulses: Normal pulses. Carotid pulses are 2+ on the right side and 2+ on the left side. Radial pulses are 2+ on the right side and 2+ on the left side. Femoral pulses are 2+ on the right side and 2+ on the left side. Popliteal pulses are 2+ on the right side and 2+ on the left side. Dorsalis pedis pulses are 2+ on the right side and 2+ on the left side. Posterior tibial pulses are 2+ on the right side and 2+ on the left side. Heart sounds: Normal heart sounds, S1 normal and S2 normal. No murmur heard. No friction rub. No gallop. Pulmonary:      Effort: Pulmonary effort is normal. No respiratory distress. Breath sounds: Normal breath sounds. No stridor. No decreased breath sounds, wheezing, rhonchi or rales. Chest:      Chest wall: No tenderness. Abdominal:      General: Bowel sounds are normal. There is no distension. Palpations: Abdomen is soft. There is no mass. Tenderness: There is no abdominal tenderness. There is no guarding or rebound. Musculoskeletal:         General: No tenderness. Normal range of motion. Cervical back: Normal range of motion and neck supple. Normal range of motion. Right lower leg: No edema. Left lower leg: No edema. Lymphadenopathy:      Cervical: No cervical adenopathy. Skin:     General: Skin is warm and dry. Capillary Refill: Capillary refill takes less than 2 seconds. Findings: No bruising, ecchymosis, lesion or rash.    Neurological: General: No focal deficit present. Mental Status: He is alert and oriented to person, place, and time. Mental status is at baseline. Cranial Nerves: No cranial nerve deficit. Sensory: No sensory deficit. Motor: No weakness. Coordination: Coordination normal.      Gait: Gait normal.      Deep Tendon Reflexes: Reflexes are normal and symmetric. Reflexes normal.   Psychiatric:         Mood and Affect: Mood normal.         Speech: Speech normal.         Behavior: Behavior normal.         Thought Content: Thought content normal.         Judgment: Judgment normal.           DIFFERENTIAL DIAGNOSIS:   Pneumonia bronchitis COVID-19 COPD CHF pneumothorax less likely ACS    DIAGNOSTIC RESULTS     EKG: All EKG's are interpreted by the Emergency Department Physician who either signs or Co-signs this chart in the absence of a cardiologist.  EKG review shows normal sinus rhythm, slight left axis deviation, ventricular rate of 85 RI interval 196 QRS duration 72 QT interval 358 QTC of 426. RADIOLOGY: non-plain film images(s) such as CT, Ultrasound and MRI are read by the radiologist.  CTA [address]   Final Result   1. There is a segmental vessel in the left lower lobe which does not    opacify with contrast. It is unclear whether this represents a pulmonary    arterial or venous branch. Additionally, this branch is partially obscured    by motion artifact which makes accurate assessment difficult. A short-term    follow-up exam is advised to exclude a pulmonary embolism. There is no    other evidence of a pulmonary embolism. 2. Mild atelectasis within the bilateral lower lobes. 3. Mild nonspecific groundglass infiltrates in the bilateral lower lobes. Groundglass infiltrates can be seen in conditions such as edema, viral    infection, and pneumonitis. 4. Additional findings are detailed above.       This document has been electronically signed by: Margarita Harris M.D. on    04/20/2022 04:00 AM      All CTs at this facility use dose modulation techniques and iterative    reconstructions, and/or weight-based dosing   when appropriate to reduce radiation to a low as reasonably achievable. XR CHEST PORTABLE   Final Result   Minor atelectasis or scar at the left lateral lung base. This document has been electronically signed by: Sanaz Borrego MD on 04/20/2022 02:29 AM            LABS:   Labs Reviewed   CBC WITH AUTO DIFFERENTIAL - Abnormal; Notable for the following components:       Result Value    WBC 11.1 (*)     Monocytes Absolute 1.6 (*)     All other components within normal limits   BASIC METABOLIC PANEL - Abnormal; Notable for the following components:    Glucose 144 (*)     All other components within normal limits   GLOMERULAR FILTRATION RATE, ESTIMATED - Abnormal; Notable for the following components:    Est, Glom Filt Rate 77 (*)     All other components within normal limits   COVID-19, RAPID   RAPID INFLUENZA A/B ANTIGENS   CULTURE, BLOOD 1   CULTURE, BLOOD 2   PROTIME-INR   APTT   BRAIN NATRIURETIC PEPTIDE   HEPATIC FUNCTION PANEL   LIPASE   TROPONIN   MAGNESIUM   ANION GAP   OSMOLALITY   TROPONIN       EMERGENCY DEPARTMENT COURSE:   Vitals:    Vitals:    04/20/22 0319 04/20/22 0355 04/20/22 0414 04/20/22 0456   BP: 133/86 135/87  (!) 140/103   Pulse:  71  88   Resp:  26  22   Temp:       TempSrc:       SpO2:  95% 98% 96%   Weight:       Height:         Patient was assessed at bedside labs and imaging ordered. Here today the patient was given medications including 2 DuoNeb's. I reviewed all labs and imaging including 2 sets of negative troponins. The patient has bilateral mild fluid collections this may be a pneumonitis or pneumonia. Patient will be given Levaquin and albuterol inhaler for home he is instructed to take those as prescribed. Had a long discussion with the patient and wife at bedside about what was going on.   It seems that the cascade of events which led to cardiac catheterization as well as echocardiogram has been building up and the patient has had similar experiences in the past.  It does not appear to be his heart. We did discuss his sleeping habits. Because it appears to be nocturnal a lot at times. He may have obstructive sleep apnea causing him to wake at night. They are also may be component of anxiety involved in this. In any event I told the patient to follow-up with his primary care physician and call for an appointment within the next 1 to 2 days and return to the nearest emergency room immediately for any new or worsening complaints. Patient understood and agreed with the plan. Patient is subsequently discharged home in stable condition. Patient has what appears to be a mild pneumonia or pneumonitis. Patient has been given a prescription for antibiotics and albuterol he is instructed to take it as prescribed. Patient also has a probable component of obstructive sleep apnea. Patient is instructed to follow-up with his primary care physician to do so within the next 1 to 2 days. Patient is instructed return to the nearest emergency room immediately for any new or worsening complaints. CRITICAL CARE:   None    CONSULTS:  None    PROCEDURES:  None    FINAL IMPRESSION      1. Pneumonia of both lower lobes due to infectious organism          DISPOSITION/PLAN   Discharge    PATIENT REFERRED TO:  No follow-up provider specified.     DISCHARGE MEDICATIONS:  New Prescriptions    ALBUTEROL SULFATE HFA (VENTOLIN HFA) 108 (90 BASE) MCG/ACT INHALER    Inhale 2 puffs into the lungs 4 times daily as needed for Wheezing    LEVOFLOXACIN (LEVAQUIN) 500 MG TABLET    Take 1 tablet by mouth daily for 7 days       (Please note that portions of this note were completed with a voice recognition program.  Efforts were made to edit the dictations but occasionally words are mis-transcribed.)    Anastasia Michel DO            [facility],   04/20/22 6930

## 2022-04-25 ENCOUNTER — OFFICE VISIT (OUTPATIENT)
Dept: CARDIOLOGY CLINIC | Age: 57
End: 2022-04-25
Payer: COMMERCIAL

## 2022-04-25 VITALS
WEIGHT: 235 LBS | DIASTOLIC BLOOD PRESSURE: 88 MMHG | HEART RATE: 76 BPM | HEIGHT: 70 IN | BODY MASS INDEX: 33.64 KG/M2 | SYSTOLIC BLOOD PRESSURE: 134 MMHG

## 2022-04-25 DIAGNOSIS — I10 PRIMARY HYPERTENSION: ICD-10-CM

## 2022-04-25 DIAGNOSIS — R07.89 CHEST PAIN, ATYPICAL: ICD-10-CM

## 2022-04-25 DIAGNOSIS — R53.82 CHRONIC FATIGUE: ICD-10-CM

## 2022-04-25 DIAGNOSIS — R06.02 SOB (SHORTNESS OF BREATH) ON EXERTION: ICD-10-CM

## 2022-04-25 DIAGNOSIS — R94.31 ABNORMAL EKG: ICD-10-CM

## 2022-04-25 DIAGNOSIS — Z98.890 S/P CARDIAC CATH: Primary | ICD-10-CM

## 2022-04-25 DIAGNOSIS — Z82.49 FAMILY HISTORY OF PREMATURE CAD: ICD-10-CM

## 2022-04-25 PROBLEM — R07.9 CHEST PAIN ON EXERTION: Status: RESOLVED | Noted: 2022-04-01 | Resolved: 2022-04-25

## 2022-04-25 LAB
BLOOD CULTURE, ROUTINE: NORMAL
BLOOD CULTURE, ROUTINE: NORMAL

## 2022-04-25 PROCEDURE — 99214 OFFICE O/P EST MOD 30 MIN: CPT | Performed by: INTERNAL MEDICINE

## 2022-04-25 NOTE — PROGRESS NOTES
Chief Complaint   Patient presents with    Follow-up     4 wk fu cath and echo      Referred for  stress test abn and chest pain    Pt is here for: ov 4 wk fu cath and echo    Last EKG was done on: 04/20/2022    Denied dizziness, palpitation or edema    Sob on exertion  Chest discomfort  On exertion    Previous hx of Rare episode of dizziness on standing and palpitation    nevere  Smoked    FHX  borther1 had stent in mid 52's  Brother 2 had mi in his 52's  Parent no cad  Father and brothers had atr fib            Past Surgical History:   Procedure Laterality Date    INGUINAL HERNIA REPAIR Right     age 5   [facility] KNEE SURGERY Right 1987    LUNG SURGERY  2000    [facility]    REPAIR UMBILICAL GGPP,3+K/R,NVTCP N/A 4/61/2560    UMBILICAL HERNIA REPAIR, POSSIBLE MESH performed by Sofia Rey MD at [facility]   Allergen Reactions    Penicillins Hives    Imdur [Isosorbide Nitrate] Other (See Comments)     Headache        Family History   Problem Relation Age of Onset    Kidney Disease Father         Social History     Socioeconomic History    Marital status:      Spouse name: Rayshawn Terry Number of children: 3    Years of education: Not on file    Highest education level: Not on file   Occupational History    Not on file   Tobacco Use    Smoking status: Never Smoker    Smokeless tobacco: Never Used   Vaping Use    Vaping Use: Never used   Substance and Sexual Activity    Alcohol use: Yes     Comment: occasional    Drug use: No    Sexual activity: Not on file   Other Topics Concern    Not on file   Social History Narrative    Not on file     Social Determinants of Health     Financial Resource Strain:     Difficulty of Paying Living Expenses: Not on file   Food Insecurity:     Worried About Running Out of Food in the Last Year: Not on file    Ac of Food in the Last Year: Not on file   Transportation Needs:     Lack of Transportation (Medical):  Not on file    Lack of Transportation (Non-Medical): Not on file   Physical Activity:     Days of Exercise per Week: Not on file    Minutes of Exercise per Session: Not on file   Stress:     Feeling of Stress : Not on file   Social Connections:     Frequency of Communication with Friends and Family: Not on file    Frequency of Social Gatherings with Friends and Family: Not on file    Attends Jew Services: Not on file    Active Member of [address] [place] [address] or Organizations: Not on file    Attends Club or Organization Meetings: Not on file    Marital Status: Not on file   Intimate Partner Violence:     Fear of Current or Ex-Partner: Not on file    Emotionally Abused: Not on file    Physically Abused: Not on file    Sexually Abused: Not on file   Housing Stability:     Unable to Pay for Housing in the Last Year: Not on file    Number of [place] in the Last Year: Not on file    Unstable Housing in the Last Year: Not on file       Current Outpatient Medications   Medication Sig Dispense Refill    levoFLOXacin (LEVAQUIN) 500 MG tablet Take 1 tablet by mouth daily for 7 days 7 tablet 0    albuterol sulfate HFA (VENTOLIN HFA) 108 (90 Base) MCG/ACT inhaler Inhale 2 puffs into the lungs 4 times daily as needed for Wheezing 18 g 0    metoprolol tartrate (LOPRESSOR) 50 MG tablet Take 1 tablet by mouth 2 times daily 60 tablet 3    quinapril-hydroCHLOROthiazide (ACCURETIC) 20-25 MG per tablet Take 0.5 tablets by mouth daily 30 tablet 3    aspirin EC 81 MG EC tablet Take 1 tablet by mouth daily 90 tablet 1     No current facility-administered medications for this visit. Review of Systems -     General ROS: negative  Psychological ROS: negative  Hematological and Lymphatic ROS: No history of blood clots or bleeding disorder.    Respiratory ROS: no cough,  or wheezing, the rest see HPI  Cardiovascular ROS: See HPI  Gastrointestinal ROS: negative  Genito-Urinary ROS: no dysuria, trouble voiding, or hematuria  Musculoskeletal ROS: negative  Neurological ROS: no TIA or stroke symptoms  Dermatological ROS: negative      Blood pressure 134/88, pulse 76, height 5' 10\" (1.778 m), weight 235 lb (106.6 kg). Physical Examination:    General appearance - alert, well appearing, and in no distress  HEENT- Pink conjunctiva  , Non-icteri sclera,PERRLA  Mental status - alert, oriented to person, place, and time  Neck - supple, no significant adenopathy, no JVD, or carotid bruits  Chest - clear to auscultation, no wheezes, rales or rhonchi, symmetric air entry  Heart - normal rate, regular rhythm, normal S1, S2, no murmurs, rubs, clicks or gallops  Abdomen - soft, nontender, nondistended, no masses or organomegaly  IZABELLA- no CVA or flank tenderness, no suprapubic tenderness  Neurological - alert, oriented, normal speech, no focal findings or movement disorder noted  Musculoskeletal/limbs - no joint tenderness, deformity or swelling   - peripheral pulses normal, no pedal edema, no clubbing or cyanosis  Skin - normal coloration and turgor, no rashes, no suspicious skin lesions noted  Psych- appropriate mood and affect    Lab  No results for input(s): CKTOTAL, CKMB, CKMBINDEX, TROPONINI in the last 72 hours.   CBC:   Lab Results   Component Value Date    WBC 11.1 04/20/2022    RBC 5.19 04/20/2022    HGB 15.1 04/20/2022    HCT 45.6 04/20/2022    MCV 87.9 04/20/2022    MCH 29.1 04/20/2022    MCHC 33.1 04/20/2022     04/20/2022    MPV 9.4 04/20/2022     BMP:    Lab Results   Component Value Date     04/20/2022    K 3.9 04/20/2022     04/20/2022    CO2 23 04/20/2022    BUN 20 04/20/2022    LABALBU 4.3 04/20/2022    CREATININE 1.0 04/20/2022    CALCIUM 9.2 04/20/2022    LABGLOM 77 04/20/2022    GLUCOSE 144 04/20/2022     Hepatic Function Panel:    Lab Results   Component Value Date    ALKPHOS 78 04/20/2022    ALT 26 04/20/2022    AST 20 04/20/2022    PROT 6.9 04/20/2022    BILITOT 0.3 04/20/2022    BILIDIR <0.2 04/20/2022    LABALBU 4.3 04/20/2022     Magnesium:    Lab Results   Component Value Date    MG 2.2 04/20/2022     Warfarin PT/INR:  No components found for: PTPATWAR, PTINRWAR  HgBA1c:  No results found for: LABA1C  FLP:    Lab Results   Component Value Date    TRIG 122 04/13/2022    HDL 40 04/13/2022    LDLCALC 81 04/13/2022     TSH:  No results found for: TSH       Conclusions      Summary   Normal left ventricle size and Low Normal LV systolic function. Ejection   fraction was estimated at 50 %. There were no regional left ventricular   wall motion abnormalities and wall thickness was within normal limits. Doppler parameters were consistent with abnormal left ventricular   relaxation (grade 1 diastolic dysfunction). When this echo is compared with the echo from -7/2018, no significant   interval changes have occurred. Signature      ----------------------------------------------------------------   Electronically signed by Casandra Arango MD (Interpreting   physician) on 04/07/2022 at 05:17 PM   ----------------------------------------------------------------      Conclusions      Summary   Left ventricle size and systolic function is normal.   Normal left ventricular wall thickness. Ejection fraction is visually estimated at 55-60%. Normal right ventricular size and function. Mild tricuspid regurgitation.       Signature      ----------------------------------------------------------------   Electronically signed by Melanie Hair MD (Interpreting   physician) on 07/17/2018 at 07:21 PM      Stress Type: Exercise      Peak HR: 145 bpm              HR Response: Appropriate   Peak BP: 172/82 mmHg          BP Response: Normal Resting BP with   Predicted HR: 165 bpm         appropriate response to Stress   % of predicted HR: 88         HR BP Product: 51956   Test duration:7.17 min        Max Exercise: 10.1 METS      Reason for Termination:       Exercise Effort: Fair   Conclusions      Summary   Patient develop very mild chest pain- tightness 3 min in to recovery, like   2/10 later 1/10 and resolved 10 min in to recovery. Exercise EKG stress test is not suggestive for ischemia. advised to continue asa 81 mg po daily and see his doctor asap      PS   did not have any chest pain during exercise      Recommendation   Clinical correlation is recommended. Signatures      ----------------------------------------------------------------   Electronically signed by Bradly Ramírez MD (Performing   Physician) on 03/09/2022 at 19:36   ----------------------------------------------------------------  ekg  Sinus  Rhythm   Low voltage in precordial leads.    -Poor R-wave progression -may be secondary to pulmonary disease   consider old anterior infarct. ABNORMAL         Assessment   Diagnosis Orders   1. 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 Primary hypertension     3. SOB (shortness of breath) on exertion     4. Chronic fatigue     5. Chest pain, atypical- NOncardiac     6. Family history of premature CAD     7. Abnormal EKG         Plan     MEDS AND LABS REVIEWED      Continue the current treatment and with constant vigilance to changes in symptoms and also any potential side effects. Return for care or seek medical attention immediately if symptoms got worse and/or develop new symptoms. CATH SITE LOOK GOOD    sob on exertion   Chest pain during recovery on reg ekg stress  FHX of premature cad  Abn ekg  Cont  lopressor to 50 bid  Cont  Accuretic 20/25 to 1/2 tab po qd   cont asa 81    Snore   dozze off  Sleepy  Fatigue  Thick neck  Possible SNEHAL  REFERE TO PULM FOR EVAL FOR SNEHAL    Hypertension, on medical treatment. Seems to be under good control. Patient is compliant with medical treatment.      Lipid panel and liver function test before next appointment    D/w pat the plan of care    Discussed use, benefit, and side effects of prescribed medications. All patient questions answered. Pt voiced understanding. Instructed to continue current medications, diet and exercise. Continue risk factor modification and medical management. Patient agreed with treatment plan. Follow up as directed.     RTC in  [address] [facility]

## 2022-04-27 RX ORDER — METOPROLOL TARTRATE 50 MG/1
TABLET, FILM COATED ORAL
Qty: 60 TABLET | Refills: 5 | Status: SHIPPED | OUTPATIENT
Start: 2022-04-27

## 2022-10-17 NOTE — PROGRESS NOTES
[facility] [address]  Pulmonary medicine clinic initial consult note. Joseph Monroe is an established patient of my sleep medicine clinic at [facility]. He came for sleep medicine evaluation today. He was seen by me for the last time on 8/22/2018      Thien Myers                                                Chief complaint: Thien Myers is a 56 y. o.oldmale came for further evaluation regarding his ?sleep apnea  with referral from Dr. Christina Chi MD     He was initially evaluated by me on 22 August 2018 at the [facility] sleep clinic. He was recommended to have a baseline sleep study to check the status of sleep apnea. However, patient never had his baseline sleep study performed so far. Few weeks back he had an episode of numbness in his left upper extremity. Patient recommended to have a sleep study performed to evaluate for sleep apnea to explain his symptoms. Patient came for further evaluation. [place]:    Sleep/Wake schedule:  Usual time to go to bed during the work/regular day of week: 10 PM  Usual time to wake up during the work//regular day of week: 4 AM  Over the weekends his sleep schedule:   [x]phase delayed. He feels better on the weekends whenever he sleeps long time  He usually falls a sleep in less than: 10 minutes  He takes naps: Yes. Number of naps per week: 2-3 times per week  During each nap he spends a total of: 15 minutes  The naps were reported as refreshing: Yes      Sleep Hygiene:  Is the temperature and evironment in his bed room is acceptable to him: Yes. He watches Television in his bed room: Yes. He listens to the music from the TV  He read books, study, pay bills etc in the bed: No.   Frequency He wake up during night/sleep: 1time  Majority of nocturnal awakenings are for urination: Yes.    Difficulty in falling back to sleep after nocturnal awakenings: No  .  Do you drink coffee: No.       Do you drink caffeinated beverages i.e sodas: Yes. He drinks up to 4 bottles of caffeinated soda per day. He drinks diet Pepsi  Do you drink tea: No.     Do you drink alcoholic beverages: Yes. He drinks alcohol very rarely. History of recreational drug use: No    Social History     Tobacco Use    Smoking status: Never    Smokeless tobacco: Never   Vaping Use    Vaping Use: Never used   Substance Use Topics    Alcohol use: Yes     Comment: occasional    Drug use: No       Sleep apnea symptoms:  Noticed to have loud snoring:Yes. Noted by his family member- spouse. Nursing staff during his hospital stay to attend his father. Witnessed apneas during sleep noticed:Yes . Noted by his family member- spouse  History of choking and gasping sensation at night time: Yes. History of headaches in the morning:Yes. Occasionally  History of dry mouth in the morning: No.   History of palpitations during night time/nocturnal awakenings: Yes. History of sweating during night time/nocturnal awakenings: No.      General:  History of head injury in the past: No.   History of seizures: No  Rest less legs syndrome symptoms:NO  History suggestive of periodic limb movements during sleep: NO  History suggestive of hypnagogic hallucinations: NO  History suggestive of hypnopompic hallucinations: NO  History suggestive of sleep talking:Yes  History suggestive of sleep walking:NO  History suggestive of bruxism: NO   History suggestive of cataplexy: NO  History suggestive of sleep paralysis: NO    Family history of sleep disorders:  Family history of obstructive sleep apnea: Yes. His brother was diagnosed with obstructive sleep apnea. His brother .   His brother used to be on treatment with a CPAP device in the past  Family history of Narcolepsy: No.   Family history of Rest less legs syndrome : No.       History regarding old sleep studies:  Prior history of sleep study: No.  Using CPAP device: No.  Currently using home Oxygen: NO.       Patient considerations:  Is the patient is ambulatory: Yes  Patient is currently using: None of these Wheelchair, [employer] or Salomon Snow. Para/Quadriplegic: NO  Hearing deficit : NO  Claustrophobic: NO  MDD : NO  Blind: NO  Incontinent: NO  Para/Quadraplegi: NO.   Need transportation to and from Sleep Center:NO      Social History:  Social History     Tobacco Use    Smoking status: Never    Smokeless tobacco: Never   Vaping Use    Vaping Use: Never used   Substance Use Topics    Alcohol use: Yes     Comment: occasional    Drug use: No   .  He is currently working: Yes. He is currently working at [employer] during daytime. He usually goes to his work at:  5:30AM.   He completes his work at:  2:00PM.                          Past Medical History:   Diagnosis Date    Hypertension     Pneumonia        Past Surgical History:   Procedure Laterality Date    INGUINAL HERNIA REPAIR Right     age 5    KNEE SURGERY Right [address]    [facility]H,HT[place] N/A 9/96/0583    UMBILICAL HERNIA REPAIR, POSSIBLE MESH performed by Kim Miller MD at [address] [facility],[place] [address]   Allergen Reactions    Penicillins Hives    Imdur [Isosorbide Nitrate] Other (See Comments)     Headache       Current Outpatient Medications   Medication Sig Dispense Refill    vitamin C (ASCORBIC ACID) 500 MG tablet Take 500 mg by mouth daily      ZINC PO Take by mouth      Cyanocobalamin (B-12 PO) Take by mouth      Cholecalciferol (VITAMIN D-3 PO) Take by mouth      turmeric 500 MG CAPS Take by mouth daily      metoprolol tartrate (LOPRESSOR) 50 MG tablet TAKE 1 TABLET BY MOUTH TWICE A DAY (Patient taking differently: 50 mg daily) 60 tablet 5    quinapril-hydroCHLOROthiazide (ACCURETIC) 20-25 MG per tablet Take 0.5 tablets by mouth daily 30 tablet 3    aspirin EC 81 MG EC tablet Take 1 tablet by mouth daily 90 tablet 1     No current facility-administered medications for this visit.        Family History   Problem Relation Age of Onset    Kidney Disease Father         Review of Systems:   General/Constitutional: He lost 11 pounds of weight from his visit with my clinic in 2018. No fever or chills. HENT: Negative. Eyes: Negative. Upper respiratory tract: No nasal stuffiness or post nasal drip. Lower respiratory tract/ lungs: No cough or sputum production. No hemoptysis. Cardiovascular: No palpitations or chest pain. Gastrointestinal: No nausea or vomiting. Neurological: No focal neurologiacal weakness. Extremities: No edema. Musculoskeletal: No complaints. Genitourinary: No complaints. Hematological: Negative. Psychiatric/Behavioral: Negative. Skin: No itching. /78 (Site: Left Upper Arm, Position: Sitting, Cuff Size: Large Adult)   Pulse 85   Temp 99.1 °F (37.3 °C)   Ht 5' 10\" (1.778 m)   Wt 238 lb 3.2 oz (108 kg)   SpO2 97% Comment: room air at rest  BMI 34.18 kg/m²   BMI:  Body mass index is 34.18 kg/m². Mallampati airway Class:3  Neck Circumference:.22 Inches  Palm sleepiness score 11/15/22: 10  Sleep apnea quality of life questionnaire: 80    Physical Exam:   Nursing note and vitals reviewed. Constitutional: Patient appears well built and well nourished. No distress. Patient is oriented to person, place, and time. HENT:   Head: Normocephalic and atraumatic. Right Ear: External ear normal.   Left Ear: External ear normal.   Mouth/Throat: Oropharynx is clear and moist.  No oral thrush. Eyes: Conjunctivae are normal. Pupils are equal, round, and reactive to light. No scleral icterus. Neck: Neck supple. No JVD present. No tracheal deviation present. Cardiovascular: Normal rate, regular rhythm, normal heart sounds. No murmur heard. Pulmonary/Chest: Effort normal and breath sounds normal. No stridor. No respiratory distress. No wheezes. No rales. Patient exhibits no tenderness. Abdominal: Soft. Patient exhibits no distension. No tenderness. Musculoskeletal: Normal range of motion. Extremities: Patient exhibits no edema and no tenderness. Lymphadenopathy:  No cervical adenopathy. Neurological: Patient is alert and oriented to person, place, and time. Skin: Skin is warm and dry. Patient is not diaphoretic. Psychiatric: Patient  has a normal mood and affect. Patient behavior is normal.     Diagnostic Data:  Echocardiogram:  04/07/2022   Conclusions   Summary   Normal left ventricle size and Low Normal LV systolic function. Ejection   fraction was estimated at 50 %. There were no regional left ventricular   wall motion abnormalities and wall thickness was within normal limits. Doppler parameters were consistent with abnormal left ventricular   relaxation (grade 1 diastolic dysfunction). When this echo is compared with the echo from -7/2018, no significant   interval changes have occurred. Signature      ----------------------------------------------------------------   Electronically signed by Melanie Terry MD (Interpreting   physician) on 04/07/2022 at 05:17 PM   ----------------------------------------------------------------     Right Ventricle   The right ventricular size was normal with normal systolic function and   wall thickness. Assessment:  -Snoring with witnessed apneas,frequent nocturnal awakenings and excessive daytime sleepiness to evaluate for obstructive sleep apnea. -Inadequate sleep hygiene.  -Hypersomnia ( Excessive daytime sleepiness)may be due to obstructive sleep apnea Vs Inadequate sleep hygiene. -S/p umblical hernia repair with mesh placement on 7/12/18  -Hypertension on meds- under control        Recommendations/Plan:  -Will schedule patient for polysomnogram in the sleep lab.    -I had a discussion with patient regarding avialable treatment options for his sleep disorder breathing including but not limited to CPAP titration in the sleep lab Vs.Dental appliance placement with referral to a local dentist Vs other available surgical options including Uvulopalatopharyngoplasty, maxillomandibular ostomy, inspire device placement and tracheostomy as last option. At the end of discussion, he decided on going for dental appliance placement as a treatment if he found to have obstructive sleep apnea during the sleep study.  -We will see Garima Talbert back in 1week after the sleep study to go over the sleep study results and further management options.  -He was educated to practice good sleep hygiene practices. He was provided with a good sleep hygiene hand out.  -Deepti Rios was advised to make earlier appointment with my clinic if he develops any worsening of sleep symptoms. He verbalizes understanding.  -Deepti Rios was advised to not to drive any motor vehicles or operate heavy equipment until his sleep symptoms are under good control. Garima Talbert verbalizes understanding.  -He was advised to loose weight by controlling diet and doing exercise once cleared by his cardiologist Dr. Sindy Acosta MD  - Garima Nitish and his wife were educated about my impression and plan. They verbalizes understanding.       -I personally reviewed updated the Past medical hx, Past surgical hx,Social hx, Family hx, Medications, Allergies in the discrete data section of the patient chart along with labs, Pulmonary medicine,Sleep medicine related, Pathological, Microbiological and Radiological investigations. Addendum done on 11/15/22 at 2:52 PM EST:  I was informed by Ms. Aric Peralta from [facility] office that the patient change his mind and did not want to go for baseline. He want to go for a split-night sleep study to diagnose and treat sleep apnea. Plan:  - Schedule patient for nocturnal polysomnogram (Sleep study) with split night protocol at [facility] sleep lab to diagnose sleep apnea and to get optimal pressure I.e CPAP or BiPAP to start/continue PAP therapy.  Patient to follow with my clinic at [facility] sleep clinic in 6 to 8 weeks with CPAP download for further evaluation.  -I had a discussion with patient regarding avialable treatment options for his sleep disorder breathing including but not limited to CPAP titration in the sleep lab Vs.Dental appliance placement with referral to a local dentist Vs other available surgical options including Uvulopalatopharyngoplasty, maxillomandibular ostomy and tracheostomy as last option. At the end of discussion, he decided on CPAP/BiPAP/AutoSV as a treatment if he found to have obstructive sleep apnea during above test/study.

## 2022-10-31 ENCOUNTER — OFFICE VISIT (OUTPATIENT)
Dept: CARDIOLOGY CLINIC | Age: 57
End: 2022-10-31
Payer: COMMERCIAL

## 2022-10-31 VITALS
WEIGHT: 235.6 LBS | DIASTOLIC BLOOD PRESSURE: 91 MMHG | HEIGHT: 70 IN | BODY MASS INDEX: 33.73 KG/M2 | HEART RATE: 91 BPM | SYSTOLIC BLOOD PRESSURE: 144 MMHG

## 2022-10-31 DIAGNOSIS — R07.89 CHEST PAIN, ATYPICAL: ICD-10-CM

## 2022-10-31 DIAGNOSIS — Z98.890 S/P CARDIAC CATH: ICD-10-CM

## 2022-10-31 DIAGNOSIS — I10 PRIMARY HYPERTENSION: Primary | ICD-10-CM

## 2022-10-31 DIAGNOSIS — R94.31 ABNORMAL EKG: ICD-10-CM

## 2022-10-31 PROCEDURE — 3074F SYST BP LT 130 MM HG: CPT | Performed by: INTERNAL MEDICINE

## 2022-10-31 PROCEDURE — 99214 OFFICE O/P EST MOD 30 MIN: CPT | Performed by: INTERNAL MEDICINE

## 2022-10-31 PROCEDURE — 3078F DIAST BP <80 MM HG: CPT | Performed by: INTERNAL MEDICINE

## 2022-10-31 NOTE — PROGRESS NOTES
Originally Referred for  stress test abn and chest pain        6 month follow up. Seen in [facility] for cp  and found to have HTN  Got 3 doses of SL NTG and with that  BP got better and cp resolved and sent home    EKG done 4-  Rare episodes of dizziness while walking  Had hx of  chest pain /discomfort over 7 month back and have been doing good till 3 days  back  No chest pain today  Denies CHARISSE.     Denied palpitation or edema    Sob on exertion      Previous hx of Rare episode of dizziness on standing and palpitation    nevere  Smoked    FHX  borther1 had stent in mid 52's  Brother 2 had mi in his 52's  Parent no cad  Father and brothers had atr fib            Past Surgical History:   Procedure Laterality Date    INGUINAL HERNIA REPAIR Right     age 5    [address]    [address] LSPT,7+G/F,TQWPH N/A 5/85/3668    UMBILICAL HERNIA REPAIR, POSSIBLE MESH performed by Naresh Gresham MD at [address] [place] [address]   Allergen Reactions    Penicillins Hives    Imdur [Isosorbide Nitrate] Other (See Comments)     Headache        Family History   Problem Relation Age of Onset    Kidney Disease Father         Social History     Socioeconomic History    Marital status:      Spouse name: Esmer Holley    Number of children: 3    Years of education: Not on file    Highest education level: Not on file   Occupational History    Not on file   Tobacco Use    Smoking status: Never    Smokeless tobacco: Never   Vaping Use    Vaping Use: Never used   Substance and Sexual Activity    Alcohol use: Yes     Comment: occasional    Drug use: No    Sexual activity: Not on file   Other Topics Concern    Not on file   Social History Narrative    Not on file     Social Determinants of Health     Financial Resource Strain: Not on file   Food Insecurity: Not on file   Transportation Needs: Not on file   Physical Activity: Not on file   Stress: Not on file   Social Connections: Not on file Intimate Partner Violence: Not on file   Housing Stability: Not on file       Current Outpatient Medications   Medication Sig Dispense Refill    metoprolol tartrate (LOPRESSOR) 50 MG tablet TAKE 1 TABLET BY MOUTH TWICE A DAY 60 tablet 5    quinapril-hydroCHLOROthiazide (ACCURETIC) 20-25 MG per tablet Take 0.5 tablets by mouth daily 30 tablet 3    aspirin EC 81 MG EC tablet Take 1 tablet by mouth daily 90 tablet 1     No current facility-administered medications for this visit. Review of Systems -     General ROS: negative  Psychological ROS: negative  Hematological and Lymphatic ROS: No history of blood clots or bleeding disorder. Respiratory ROS: no cough,  or wheezing, the rest see HPI  Cardiovascular ROS: See HPI  Gastrointestinal ROS: negative  Genito-Urinary ROS: no dysuria, trouble voiding, or hematuria  Musculoskeletal ROS: negative  Neurological ROS: no TIA or stroke symptoms  Dermatological ROS: negative      Blood pressure (!) 144/91, pulse 91, height 5' 10\" (1.778 m), weight 235 lb 9.6 oz (106.9 kg).         Physical Examination:    General appearance - alert, well appearing, and in no distress  HEENT- Pink conjunctiva  , Non-icteri sclera,PERRLA  Mental status - alert, oriented to person, place, and time  Neck - supple, no significant adenopathy, no JVD, or carotid bruits  Chest - clear to auscultation, no wheezes, rales or rhonchi, symmetric air entry  Heart - normal rate, regular rhythm, normal S1, S2, no murmurs, rubs, clicks or gallops  Abdomen - soft, nontender, nondistended, no masses or organomegaly  IZABELLA- no CVA or flank tenderness, no suprapubic tenderness  Neurological - alert, oriented, normal speech, no focal findings or movement disorder noted  Musculoskeletal/limbs - no joint tenderness, deformity or swelling   - peripheral pulses normal, no pedal edema, no clubbing or cyanosis  Skin - normal coloration and turgor, no rashes, no suspicious skin lesions noted  Psych- appropriate mood and affect    Lab  No results for input(s): CKTOTAL, CKMB, CKMBINDEX, TROPONINI in the last 72 hours. CBC:   Lab Results   Component Value Date/Time    WBC 11.1 04/20/2022 01:58 AM    RBC 5.19 04/20/2022 01:58 AM    HGB 15.1 04/20/2022 01:58 AM    HCT 45.6 04/20/2022 01:58 AM    MCV 87.9 04/20/2022 01:58 AM    MCH 29.1 04/20/2022 01:58 AM    MCHC 33.1 04/20/2022 01:58 AM     04/20/2022 01:58 AM    MPV 9.4 04/20/2022 01:58 AM     BMP:    Lab Results   Component Value Date/Time     04/20/2022 01:58 AM    K 3.9 04/20/2022 01:58 AM     04/20/2022 01:58 AM    CO2 23 04/20/2022 01:58 AM    BUN 20 04/20/2022 01:58 AM    LABALBU 4.3 04/20/2022 01:58 AM    CREATININE 1.0 04/20/2022 01:58 AM    CALCIUM 9.2 04/20/2022 01:58 AM    LABGLOM 77 04/20/2022 01:58 AM    GLUCOSE 144 04/20/2022 01:58 AM     Hepatic Function Panel:    Lab Results   Component Value Date/Time    ALKPHOS 78 04/20/2022 01:58 AM    ALT 26 04/20/2022 01:58 AM    AST 20 04/20/2022 01:58 AM    PROT 6.9 04/20/2022 01:58 AM    BILITOT 0.3 04/20/2022 01:58 AM    BILIDIR <0.2 04/20/2022 01:58 AM    LABALBU 4.3 04/20/2022 01:58 AM     Magnesium:    Lab Results   Component Value Date/Time    MG 2.2 04/20/2022 01:58 AM     Warfarin PT/INR:  No components found for: PTPATWAR, PTINRWAR  HgBA1c:  No results found for: LABA1C  FLP:    Lab Results   Component Value Date/Time    TRIG 122 04/13/2022 11:17 AM    HDL 40 04/13/2022 11:17 AM    LDLCALC 81 04/13/2022 11:17 AM     TSH:  No results found for: TSH  Procedure Summary  Angiographically Patent Coronaries. 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 BOTHER LCX AND RCA HAS SEPARATE OSTIUM FROM RIGHT SINUS OF VALSALVA BUT SEPTATE OSTIUM  Normal LV systolic function. LVEF approximately 55% . LV size - normal.  EDP 14 mmhg  No Transaortic Gradient  Recommendations  Continue with aggressive risk factor modification and medical therapy.   WORK UP FOR NONCARDIAC CAUSE OF CP  CONSIDER CORONARY CT ONCE OTHER CAUSES OF CP EXCLUDED AND IF PAT REMAIN TO HAVE CP - TO  EXCLUDE RARE EVENTS OF INTER ARTERIAL COURSE  Estimated Blood Loss: 10 ml. Complications: No complications. Signatures  Electronically signed by Clrae Resendiz MD (Performing Physician) on 04/13/2022 at 14:13     Conclusions      Summary   Normal left ventricle size and Low Normal LV systolic function. Ejection   fraction was estimated at 50 %. There were no regional left ventricular   wall motion abnormalities and wall thickness was within normal limits. Doppler parameters were consistent with abnormal left ventricular   relaxation (grade 1 diastolic dysfunction). When this echo is compared with the echo from -7/2018, no significant   interval changes have occurred. Signature      ----------------------------------------------------------------   Electronically signed by Clare Resendiz MD (Interpreting   physician) on 04/07/2022 at 05:17 PM   ----------------------------------------------------------------      Conclusions      Summary   Left ventricle size and systolic function is normal.   Normal left ventricular wall thickness. Ejection fraction is visually estimated at 55-60%. Normal right ventricular size and function. Mild tricuspid regurgitation.       Signature      ----------------------------------------------------------------   Electronically signed by Tonio Powell MD (Interpreting   physician) on 07/17/2018 at 07:21 PM      Stress Type: Exercise      Peak HR: 145 bpm              HR Response: Appropriate   Peak BP: 172/82 mmHg          BP Response: Normal Resting BP with   Predicted HR: 165 bpm         appropriate response to Stress   % of predicted HR: 88         HR BP Product: 37462   Test duration:7.17 min        Max Exercise: 10.1 METS      Reason for Termination:       Exercise Effort: Fair   Conclusions      Summary   Patient develop very mild chest pain- tightness 3 min in to recovery, like   2/10 later 1/10 and resolved 10 min in to recovery. Exercise EKG stress test is not suggestive for ischemia. advised to continue asa 81 mg po daily and see his doctor asap      PS   did not have any chest pain during exercise      Recommendation   Clinical correlation is recommended. Signatures      ----------------------------------------------------------------   Electronically signed by Suri Reyes MD (Performing   Physician) on 03/09/2022 at 19:36   ----------------------------------------------------------------          Ekg 4/1/22  Sinus  Rhythm   Low voltage in precordial leads.    -Poor R-wave progression -may be secondary to pulmonary disease   consider old anterior infarct. ABNORMAL     Ekg 10/31/2022  NSR  PRWP  Anterior infarct, old  No new or acute abn        Assessment   Diagnosis Orders   1. Primary hypertension        2. Chest pain, atypical- NOncardiac        3. 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 Abnormal EKG              Plan     The current MEDS AND LABS REVIEWED    Continue the current treatment and with constant vigilance to changes in symptoms and also any potential side effects. Return for care or seek medical attention immediately if symptoms got worse and/or develop new symptoms.     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 Seems to be under good control. Patient is compliant with medical treatment. Lipid panel and liver function test before next appointment    D/w pat the plan of care    Discussed use, benefit, and side effects of prescribed medications. All patient questions answered. Pt voiced understanding. Instructed to continue current medications, diet and exercise. Continue risk factor modification and medical management. Patient agreed with treatment plan. Follow up as directed.     RTC in  [address], [facility]

## 2022-11-01 ENCOUNTER — TELEPHONE (OUTPATIENT)
Dept: CARDIOLOGY CLINIC | Age: 57
End: 2022-11-01

## 2022-11-01 NOTE — TELEPHONE ENCOUNTER
Patient returned call, instructions given for CTA coronary on 11/09/22, patient to arrive at noon. Patient verbalized understanding of all instructions, instructions also mailed to patient this date.

## 2022-11-01 NOTE — TELEPHONE ENCOUNTER
CTA coronary scheduled 11-09-22 @ 1:00pm, arrive 12:00pm    Left msg for pt to call office for date, time and instructions

## 2022-11-09 ENCOUNTER — HOSPITAL ENCOUNTER (OUTPATIENT)
Dept: CT IMAGING | Age: 57
Discharge: HOME | End: 2022-11-09
Payer: COMMERCIAL

## 2022-11-09 VITALS — HEART RATE: 68 BPM | SYSTOLIC BLOOD PRESSURE: 147 MMHG | DIASTOLIC BLOOD PRESSURE: 93 MMHG

## 2022-11-09 DIAGNOSIS — I10 PRIMARY HYPERTENSION: ICD-10-CM

## 2022-11-09 DIAGNOSIS — R07.89 CHEST PAIN, ATYPICAL: ICD-10-CM

## 2022-11-09 DIAGNOSIS — Z98.890 S/P CARDIAC CATH: ICD-10-CM

## 2022-11-09 DIAGNOSIS — R94.31 ABNORMAL EKG: ICD-10-CM

## 2022-11-09 LAB — POC CREATININE WHOLE BLOOD: 1 MG/DL (ref 0.5–1.2)

## 2022-11-09 PROCEDURE — 2500000003 HC RX 250 WO HCPCS: Performed by: RADIOLOGY

## 2022-11-09 PROCEDURE — 6370000000 HC RX 637 (ALT 250 FOR IP): Performed by: RADIOLOGY

## 2022-11-09 PROCEDURE — 6360000004 HC RX CONTRAST MEDICATION: Performed by: INTERNAL MEDICINE

## 2022-11-09 PROCEDURE — 75574 CT ANGIO HRT W/3D IMAGE: CPT

## 2022-11-09 PROCEDURE — 82565 ASSAY OF CREATININE: CPT

## 2022-11-09 RX ORDER — METOPROLOL TARTRATE 5 MG/5ML
5 INJECTION INTRAVENOUS ONCE
Status: COMPLETED | OUTPATIENT
Start: 2022-11-09 | End: 2022-11-09

## 2022-11-09 RX ORDER — NITROGLYCERIN 0.4 MG/1
0.4 TABLET SUBLINGUAL ONCE
Status: COMPLETED | OUTPATIENT
Start: 2022-11-09 | End: 2022-11-09

## 2022-11-09 RX ADMIN — IOPAMIDOL 80 ML: 755 INJECTION, SOLUTION INTRAVENOUS at 12:44

## 2022-11-09 RX ADMIN — METOPROLOL TARTRATE 5 MG: 5 INJECTION, SOLUTION INTRAVENOUS at 13:02

## 2022-11-09 RX ADMIN — METOPROLOL TARTRATE 5 MG: 5 INJECTION, SOLUTION INTRAVENOUS at 12:57

## 2022-11-09 RX ADMIN — METOPROLOL TARTRATE 5 MG: 5 INJECTION, SOLUTION INTRAVENOUS at 13:07

## 2022-11-09 RX ADMIN — NITROGLYCERIN 0.4 MG: 0.4 TABLET, ORALLY DISINTEGRATING SUBLINGUAL at 13:08

## 2022-11-09 ASSESSMENT — PAIN SCALES - GENERAL: PAINLEVEL_OUTOF10: 0

## 2022-11-09 NOTE — PROGRESS NOTES
1248 Pt arrived to CT scan for CTA coronary. 1249 Exam explained using teach back method. Pt states understanding. 1251 Patient assisted to table in supine position. Monitor attached to patient. Comfort ensured. 1309 Exam begins. 1311 Exam complete. 1316 Monitor removed. Patient assisted to seated position. Comfort ensured. 1319 Patient ambulated to discharge lobby in stable condition. Wife at side.

## 2022-11-15 ENCOUNTER — INITIAL CONSULT (OUTPATIENT)
Dept: PULMONOLOGY | Age: 57
End: 2022-11-15
Payer: COMMERCIAL

## 2022-11-15 VITALS
HEIGHT: 70 IN | WEIGHT: 238.2 LBS | TEMPERATURE: 99.1 F | DIASTOLIC BLOOD PRESSURE: 78 MMHG | SYSTOLIC BLOOD PRESSURE: 138 MMHG | HEART RATE: 85 BPM | BODY MASS INDEX: 34.1 KG/M2 | OXYGEN SATURATION: 97 %

## 2022-11-15 DIAGNOSIS — R06.81 APNEA: ICD-10-CM

## 2022-11-15 DIAGNOSIS — R06.83 SNORING: ICD-10-CM

## 2022-11-15 DIAGNOSIS — I10 PRIMARY HYPERTENSION: ICD-10-CM

## 2022-11-15 DIAGNOSIS — G47.10 HYPERSOMNIA: ICD-10-CM

## 2022-11-15 DIAGNOSIS — R53.82 CHRONIC FATIGUE: ICD-10-CM

## 2022-11-15 DIAGNOSIS — G47.30 SLEEP APNEA, UNSPECIFIED TYPE: Primary | ICD-10-CM

## 2022-11-15 PROCEDURE — 3074F SYST BP LT 130 MM HG: CPT | Performed by: INTERNAL MEDICINE

## 2022-11-15 PROCEDURE — 99204 OFFICE O/P NEW MOD 45 MIN: CPT | Performed by: INTERNAL MEDICINE

## 2022-11-15 PROCEDURE — 3078F DIAST BP <80 MM HG: CPT | Performed by: INTERNAL MEDICINE

## 2022-11-15 RX ORDER — VIT C/B6/B5/MAGNESIUM/HERB 173 50-5-6-5MG
CAPSULE ORAL DAILY
COMMUNITY

## 2022-11-15 RX ORDER — ASCORBIC ACID 500 MG
500 TABLET ORAL DAILY
COMMUNITY

## 2022-11-15 NOTE — PATIENT INSTRUCTIONS
Recommendations/Plan:  -Will schedule patient for polysomnogram in the sleep lab. -I had a discussion with patient regarding avialable treatment options for his sleep disorder breathing including but not limited to CPAP titration in the sleep lab Vs.Dental appliance placement with referral to a local dentist Vs other available surgical options including Uvulopalatopharyngoplasty, maxillomandibular ostomy, inspire device placement and tracheostomy as last option. At the end of discussion, he decided on going for dental appliance placement as a treatment if he found to have obstructive sleep apnea during the sleep study.  -We will see Rayo Causey back in 1week after the sleep study to go over the sleep study results and further management options.  -He was educated to practice good sleep hygiene practices. He was provided with a good sleep hygiene hand out.  -Mahogany Hatch was advised to make earlier appointment with my clinic if he develops any worsening of sleep symptoms. He verbalizes understanding.  -Mahogany Hatch was advised to not to drive any motor vehicles or operate heavy equipment until his sleep symptoms are under good control. Rayo Causey verbalizes understanding.  -He was advised to loose weight by controlling diet and doing exercise once cleared by his cardiologist Dr. Shawanda Ricardo MD  - Rayo Causey and his wife were educated about my impression and plan.  They verbalizes understanding

## 2022-11-15 NOTE — PROGRESS NOTES
Chief Complaint: New sleep consult     Mallampati airway Class:3  Neck Circumference:.22 Inches    [place] sleepiness score 11/15/22: 10  Sleep apnea quality of life questionnaire:.85

## 2022-12-04 ENCOUNTER — HOSPITAL ENCOUNTER (OUTPATIENT)
Dept: SLEEP CENTER | Age: 57
Discharge: HOME | End: 2022-12-06
Payer: COMMERCIAL

## 2022-12-04 DIAGNOSIS — I10 PRIMARY HYPERTENSION: ICD-10-CM

## 2022-12-04 DIAGNOSIS — R06.81 APNEA: ICD-10-CM

## 2022-12-04 DIAGNOSIS — R06.83 SNORING: ICD-10-CM

## 2022-12-04 DIAGNOSIS — G47.30 SLEEP APNEA, UNSPECIFIED TYPE: ICD-10-CM

## 2022-12-04 DIAGNOSIS — G47.10 HYPERSOMNIA: ICD-10-CM

## 2022-12-04 DIAGNOSIS — R53.82 CHRONIC FATIGUE: ICD-10-CM

## 2022-12-04 PROCEDURE — 95811 POLYSOM 6/>YRS CPAP 4/> PARM: CPT

## 2022-12-04 PROCEDURE — 95810 POLYSOM 6/> YRS 4/> PARAM: CPT

## 2022-12-05 LAB — STATUS: NORMAL

## 2022-12-05 NOTE — PROGRESS NOTES
Pt. Was unable to tolerate CPAP. Terminated study [id number]. CHRIS Quintana You can access the FollowMyHealth Patient Portal offered by [facility] by registering at the following website: [email or web address]. By joining [facility]’s FollowMyHealth portal, you will also be able to view your health information using other applications (apps) compatible with our system.

## 2022-12-06 NOTE — PROGRESS NOTES
[address] [place] [address]                               SLEEP STUDY REPORT    PATIENT NAME: Gómez Hernandez                  :        1965  MED REC NO:   [id number]                           ROOM:  ACCOUNT NO:   [de-identified]                           ADMIT DATE: 2022  PROVIDER:     Zenaida Mcneal. MD Joby    DATE OF STUDY:  2022    SPLIT-NIGHT SLEEP STUDY REPORT    REFERRING PROVIDER:  Augie Landis MD    The patient's height is 70 inches, weight is 238 pounds with a BMI of  34.1. HISTORY:  The patient is a 79-year-old gentleman who was initially  evaluated by me on 11/15/2022. The patient gave a history of snoring  with witnessed apneas. The patient having frequent nocturnal  awakenings. The patient also suffering with hypersomnia with [place]  Sleepiness Score of 10. The patient is scheduled for split-night sleep  study to diagnose and treat sleep apnea. The patient had associated  comorbidities including hypersomnia and hypertension. METHODS:  The patient underwent digital polysomnography in compliance  with the standards and specifications from the AASM Manual including the  simultaneous recording of 3 EEG channels (F4-M1, C4-M1, and O2-M1 with  back up electrodes F3-M2, C3-M2, and O1-M2), 2 EOG channels (E1-M2, and  E2-M1,), EMG (chin, left & right leg), EKG, Nonin pulse oximetry with   less than 2 second averaging time, body position, airflow recorded by  oral-nasal thermal sensor and nasal air pressure transducer, plus  respiratory effort recorded by calibrated respiratory inductance  plethysmography (RIP), flow volume loop, sound and video. Sleep staging  and scoring followed the standard put forth by the American Academy of  Sleep Medicine and utilized the 1B obstructive hypopnea event  desaturation of 4 percent or greater.     INTERPRETATION:  This is a split-night sleep study, which is going to be  dictated as part A and part B. PART A:  The diagnostic portion of split-night sleep study was performed  on 12/04/2022. The study was started at 09:46 p.m. and was terminated  at 11:46 p.m. with the total recording time was 120 minutes, sleep  period time was 106.7 minutes, total sleep time was 49.5 minutes, and  overall sleep efficiency was 41.2% of total sleep time. The sleep onset  latency was 13.3 minutes, wake after sleep onset was 57.2 minutes, and  REM sleep latency was not available due to absent REM sleep. SLEEP STAGING AND DISTRIBUTION SUMMARY:  Revealed the patient spent 31  minutes in stage I consisting of 62.6% of total sleep time, 18.5 minutes  in stage II consisting of 37.4%. The patient had absent slow wave sleep  and also REM sleep. RESPIRATORY EVENT ANALYSIS:  Revealed the patient had a total of 59  apneas, all of them were obstructive in nature. The patient also had a  total of 31 hypopneas, all of them were obstructive in nature. The  total number of apneas and hypopneas recorded during the study was 90  with an apnea-hypopnea index of 109.1. The patient's REM sleep  apnea-hypopnea index was not available. POSITION ANALYSIS:  Revealed the patient spent 49.5 minutes in supine  position with supine apnea-hypopnea index was 109.1. During the entire  diagnostic portion of split-night sleep study, the patient slept in  supine position only. PERIODIC LIMB MOVEMENT ANALYSIS:  Revealed the patient had a total of 4  periodic limb movements. Out of 4, all of them were associated with  arousals with a PLM index of 4.8. PLM arousal index is 4.8. The  patient had a total of 14 spontaneous arousals with a spontaneous  arousal index of 17. OXYGEN SATURATION MONITORING:  Revealed the patient had a maximum oxygen  desaturation to 83% with a mean oxygen saturation of 92.8%. The patient  spent a total of 4.3 minutes below oxygen saturation less than 88%.     EKG MONITORING: Revealed normal sinus rhythm. The patient found to have soft-to-moderate snoring during the sleep  study. PART B:  The treatment part of split-night sleep study was performed on  12/04/2022. The study was started at 11:56 p.m. and was terminated at  12:37 a.m. with a total recording time of 41 minutes, sleep period time  was 30.7 minutes, total sleep time was 26 minutes and overall sleep  efficiency was 63.4% of total sleep time. The sleep onset latency was  10.3 minutes, wake after sleep-onset was 4.7 minutes, and REM sleep  latency was not available due to absent REM sleep. SLEEP STAGING AND DISTRIBUTION SUMMARY:  Revealed the patient spent 4.5  minutes in stage I consisting of 17.3% of total sleep time, 21.5 minutes  in stage II consisting of 82.7%. The patient had absent slow wave sleep  and also REM sleep. CPAP TITRATION STUDY:  The CPAP titration study was started with a CPAP  pressure of 5 cm of water, and the CPAP pressure was gradually increased  to a CPAP pressure of 8 cm of water by titrating to apneas and  hypopneas. At a CPAP pressure of 8 cm of water, the patient spent 5  minutes 45 seconds in bed. Out of 5 minutes 45 seconds, the patient  slept only 1 minute 31 seconds in non-REM sleep. The patient did not  achieve REM sleep at this pressure. At a CPAP pressure of 8 cm of  water, the patient had a total of 3 obstructive apneas and 1 obstructive  hypopnea with an apnea-hypopnea index of 157.4. The maximum oxygen  desaturation recorded at this pressure was 86% with a mean oxygen  saturation of 90.2%. The patient had a difficulty in tolerating the CPAP mask and pressure. The patient woke up at 00:37 a.m. with panicky feeling. The patient  pulled his mask and kept out and requested to terminate the study. Please see technician's comment for further details. PERIODIC LIMB MOVEMENT ANALYSIS:  Revealed the patient did not have any  periodic limb movements.   The patient had a total of 0 spontaneous  arousals. EKG MONITORING:  Revealed normal sinus rhythm. IMPRESSION:  1. Severe obstructive sleep apnea. The patient had unacceptable  titration up to a CPAP pressure of 8 cm of water. At this pressure, the  patient became panic and requested to terminate the sleep study. The  study was terminated at 00:37 a.m. 2.  Hypersomnia, most likely due to sleep apnea. 3.  Status post umbilical hernia repair with mesh placement in 2018. 4.  Hypertension. RECOMMENDATIONS:  1. The patient should be scheduled for a followup with my clinic as  soon as possible to discuss about the sleep study finding for further  management. 2.  If the patient agrees to go for PAP therapy, the patient will be  considered for auto CPAP therapy. 3.  The patient's choice of interface was F20 medium size mask. Thanks to Kristen Pool MD, for giving me this opportunity to  participate in the care of this pleasant gentleman.         Zunilda Recinos MD    D: 12/05/2022 16:15:35       T: 12/06/2022 13:21:33     [email or web address]  Job#: [id number]     Doc#: [id number]    CC:

## 2022-12-07 ENCOUNTER — TELEPHONE (OUTPATIENT)
Dept: SLEEP CENTER | Age: 57
End: 2022-12-07

## 2022-12-07 NOTE — TELEPHONE ENCOUNTER
Corina Rogers was here for a Split night study on Sunday 12/4/22, a Split night study was attempted, however, Corina Rogers did not tolerate wearing the CPAP mask and pressure and ultimately terminated the remainder of the study. Dr. Chapis Dale would like for him to be scheduled back for a F/U ASAP to go over the findings. His current F/U is not until 322/23, can someone please call patient and get him R/S. Thanks so much!

## 2022-12-22 ENCOUNTER — OFFICE VISIT (OUTPATIENT)
Dept: PULMONOLOGY | Age: 57
End: 2022-12-22
Payer: COMMERCIAL

## 2022-12-22 VITALS
DIASTOLIC BLOOD PRESSURE: 80 MMHG | TEMPERATURE: 97.9 F | OXYGEN SATURATION: 97 % | HEIGHT: 70 IN | WEIGHT: 234 LBS | BODY MASS INDEX: 33.5 KG/M2 | HEART RATE: 91 BPM | SYSTOLIC BLOOD PRESSURE: 134 MMHG

## 2022-12-22 DIAGNOSIS — R53.82 CHRONIC FATIGUE: ICD-10-CM

## 2022-12-22 DIAGNOSIS — G47.10 HYPERSOMNIA: ICD-10-CM

## 2022-12-22 DIAGNOSIS — R06.81 APNEA: ICD-10-CM

## 2022-12-22 DIAGNOSIS — G47.33 OSA (OBSTRUCTIVE SLEEP APNEA): Primary | ICD-10-CM

## 2022-12-22 DIAGNOSIS — R06.83 SNORING: ICD-10-CM

## 2022-12-22 PROCEDURE — 99213 OFFICE O/P EST LOW 20 MIN: CPT | Performed by: NURSE PRACTITIONER

## 2022-12-22 PROCEDURE — 3074F SYST BP LT 130 MM HG: CPT | Performed by: NURSE PRACTITIONER

## 2022-12-22 PROCEDURE — 3078F DIAST BP <80 MM HG: CPT | Performed by: NURSE PRACTITIONER

## 2022-12-22 ASSESSMENT — ENCOUNTER SYMPTOMS
NAUSEA: 0
RESPIRATORY NEGATIVE: 1
VOMITING: 0
ALLERGIC/IMMUNOLOGIC NEGATIVE: 1
CHEST TIGHTNESS: 0
STRIDOR: 0
DIARRHEA: 0
WHEEZING: 0
GASTROINTESTINAL NEGATIVE: 1
EYES NEGATIVE: 1

## 2022-12-22 NOTE — PROGRESS NOTES
[facility]    Whitney Orlando, 56 y.o.  [id number]      Pt of Dr Meek Mena Notes   1 month sleep follow up after titration  Study Results  Initial Study Date -  12/4/22  AHI -  109.1    TotalEvents - 90  (Apneas  59  Hypopneas 31  Central  0)  LM w/Arousals - 84  Sleep Efficiency - 41.2 % (Total Sleep Time - 49.5 min)  Time with Sats below 88% - 4.3 min    ESS: 10  SAQLI: 80      Interval History       Whitney Orlando is a 56 y.o. old male who comes in to review the results of his recent sleep study, to answer questions and to explore options for treatment. he continues to have symptoms of snoring, periods of not breathing, feels sleepy during the day  Patient did not tolerate PAP therapy at all   Refusing for any PAP therapy currently   Discussed possible consequences of not treating SNEHAL and patient verbalized understanding   States the only reason he is here is because of his wife    Allergies  Allergies   Allergen Reactions    Penicillins Hives    Imdur [Isosorbide Nitrate] Other (See Comments)     Headache     Meds  Current Outpatient Medications   Medication Sig Dispense Refill    vitamin C (ASCORBIC ACID) 500 MG tablet Take 500 mg by mouth daily      ZINC PO Take by mouth      Cyanocobalamin (B-12 PO) Take by mouth      Cholecalciferol (VITAMIN D-3 PO) Take by mouth      turmeric 500 MG CAPS Take by mouth daily      metoprolol tartrate (LOPRESSOR) 50 MG tablet TAKE 1 TABLET BY MOUTH TWICE A DAY (Patient taking differently: 50 mg daily) 60 tablet 5    quinapril-hydroCHLOROthiazide (ACCURETIC) 20-25 MG per tablet Take 0.5 tablets by mouth daily 30 tablet 3    aspirin EC 81 MG EC tablet Take 1 tablet by mouth daily 90 tablet 1     No current facility-administered medications for this visit. ROS  Review of Systems   Constitutional:  Positive for fatigue. Negative for chills, fever and unexpected weight change. HENT: Negative. Eyes: Negative. Respiratory: Negative. Negative for chest tightness, wheezing and stridor. Cardiovascular:  Negative for chest pain and leg swelling. Gastrointestinal: Negative. Negative for diarrhea, nausea and vomiting. Endocrine: Negative. Genitourinary: Negative. Negative for dysuria. Musculoskeletal: Negative. Skin: Negative. Allergic/Immunologic: Negative. Neurological: Negative. Hematological: Negative. Psychiatric/Behavioral:  Positive for sleep disturbance. Exam  Vitals -  /80 (Site: Left Upper Arm, Position: Sitting, Cuff Size: Medium Adult)   Pulse 91   Temp 97.9 °F (36.6 °C) (Oral)   Ht 5' 10\" (1.778 m)   Wt 234 lb (106.1 kg)   SpO2 97% Comment: Patient on room air  BMI 33.58 kg/m²    Body mass index is 33.58 kg/m². Oxygen level - 92.8%  Physical Exam  Vitals and nursing note reviewed. Constitutional:       General: He is not in acute distress. Appearance: He is well-developed. He is obese. HENT:      Head: Normocephalic and atraumatic. Neck:      Trachea: No tracheal deviation. Cardiovascular:      Rate and Rhythm: Normal rate and regular rhythm. Heart sounds: Normal heart sounds. No murmur heard. Pulmonary:      Effort: Pulmonary effort is normal. No respiratory distress. Breath sounds: Normal breath sounds. No stridor. No wheezing or rales. Chest:      Chest wall: No tenderness. Abdominal:      General: Bowel sounds are normal. There is no distension. Palpations: Abdomen is soft. Skin:     General: Skin is warm and dry. Capillary Refill: Capillary refill takes less than 2 seconds. Neurological:      Mental Status: He is alert and oriented to person, place, and time. Psychiatric:         Behavior: Behavior normal.         Thought Content: Thought content normal.         Judgment: Judgment normal.      Assessment   Diagnosis Orders   1. SNEHAL (obstructive sleep apnea)        2. Chronic fatigue        3. Apnea        4.  Snoring 5. Hypersomnia           Recommendations    -PSG reviewed with patient and patient refuses any treatment   -Patient refused to go for any treatment for his obstructive sleep apnea. He verbalizes understanding of consequences of his decision including but not limited to increased cardiovascular and cerebrovascular events with increased morbidity and mortality. He was also instructed to not to drive any motor vehicles or operate heavy equipment if he feels sleepy. Patient to follow with my clinic at [facility] sleep clinic if he decided to go for treatment. Patient verbalizes understanding. -RTC if patient wants to pursue treatment.      Electronically signed by TORY Hidalgo CNP on 12/22/2022 at 1:52 PM

## (undated) DEVICE — COVER ARMBRD W13XL28.5IN IMPERV BLU FOR OP RM

## (undated) DEVICE — BLADE CLIPPER GEN PURP NS

## (undated) DEVICE — GOWN,SIRUS,NONRNF,SETINSLV,XL,20/CS: Brand: MEDLINE

## (undated) DEVICE — GLOVE ORANGE PI 8   MSG9080

## (undated) DEVICE — APPLICATOR PREP 26ML 0.7% IOD POVACRYLEX 74% ISO ALC ST

## (undated) DEVICE — BREAST HERNIA PACK: Brand: MEDLINE INDUSTRIES, INC.

## (undated) DEVICE — GOWN,SIRUS,NON REINFRCD,LARGE,SET IN SL: Brand: MEDLINE

## (undated) DEVICE — SKIN AFFIX SURG ADHESIVE 72/CS 0.55ML: Brand: MEDLINE

## (undated) DEVICE — GLOVE ORANGE PI 7   MSG9070